# Patient Record
Sex: FEMALE | Race: WHITE | HISPANIC OR LATINO | Employment: STUDENT | URBAN - METROPOLITAN AREA
[De-identification: names, ages, dates, MRNs, and addresses within clinical notes are randomized per-mention and may not be internally consistent; named-entity substitution may affect disease eponyms.]

---
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
## 2022-01-31 ENCOUNTER — TELEPHONE (OUTPATIENT)
Dept: PEDIATRICS CLINIC | Facility: CLINIC | Age: 22
End: 2022-01-31

## 2022-01-31 NOTE — TELEPHONE ENCOUNTER
Spoke to mom scheduled an appointment for daughter to be seen on 2/11/2022 with Meme Mena  Mom states daughter has a heart condition which in [facility] they did 2 ecg  Informed mom if she's able to get the records from [facility] and mom stated they did not give her anything and is not able to get the records

## 2022-02-11 ENCOUNTER — OFFICE VISIT (OUTPATIENT)
Dept: PEDIATRICS CLINIC | Facility: CLINIC | Age: 22
End: 2022-02-11
Payer: COMMERCIAL

## 2022-02-11 VITALS
TEMPERATURE: 98.3 F | DIASTOLIC BLOOD PRESSURE: 64 MMHG | SYSTOLIC BLOOD PRESSURE: 108 MMHG | HEART RATE: 88 BPM | HEIGHT: 62 IN | WEIGHT: 123.8 LBS | BODY MASS INDEX: 22.78 KG/M2

## 2022-02-11 DIAGNOSIS — F41.9 ANXIETY: ICD-10-CM

## 2022-02-11 DIAGNOSIS — R07.9 CHEST PAIN, UNSPECIFIED TYPE: Primary | ICD-10-CM

## 2022-02-11 LAB — SL AMB POCT HGB: 12.2

## 2022-02-11 PROCEDURE — 3008F BODY MASS INDEX DOCD: CPT | Performed by: NURSE PRACTITIONER

## 2022-02-11 PROCEDURE — 85018 HEMOGLOBIN: CPT | Performed by: NURSE PRACTITIONER

## 2022-02-11 PROCEDURE — 1036F TOBACCO NON-USER: CPT | Performed by: NURSE PRACTITIONER

## 2022-02-11 PROCEDURE — 99213 OFFICE O/P EST LOW 20 MIN: CPT | Performed by: NURSE PRACTITIONER

## 2022-02-11 RX ORDER — MULTIVIT WITH MINERALS/LUTEIN
1000 TABLET ORAL DAILY
COMMUNITY

## 2022-02-11 RX ORDER — NAPROXEN SODIUM 550 MG/1
550 TABLET ORAL
COMMUNITY
Start: 2021-04-28 | End: 2022-04-28

## 2022-02-11 RX ORDER — MELATONIN
1000 DAILY
COMMUNITY

## 2022-02-11 NOTE — PROGRESS NOTES
Assessment/Plan:    1  Chest pain, unspecified type  -     Ambulatory Referral to Pediatric Cardiology; Future  -     POCT hemoglobin fingerstick       Results for orders placed or performed in visit on 02/11/22   POCT hemoglobin fingerstick   Result Value Ref Range    Hemoglobin 12 2        Hgb low normal for age  Not technically anemic  Encourage iron rich foods but doesn't need to necessarily supplement  Cardio referral at this point for further eval   Reviewed red flags to go to ER for  Also offered behavioral health referral/list of outpatient mental health providers due to anxiety which may be contributing to symptoms; child and Mom prefer to re-connect with her old therapist   Will call if needing any other resources  If no improvement, asked family to also call back to office  Another consideration for dizziness would be to see ENT again (saw in the past for vestibular therapy, although this should not be contributing to chest pain); or pulmonology consult (for PFT for the chest pain)  Subjective:      Patient ID: Chico Pierre is a 25 y o  female  HPI    Here today for concerns for chest pain, occasional dizziness  Mom also present  Mother and child report that she was previously followed in [facility] for tachycardia and a heart murmur  Mom reports that she thinks the heart murmur was not translated over correctly  She is not sure exactly what her diagnosis was however  She needs to follow-up with a cardiologist here and requests referral today  Child reports that she has been having pain - she indicates in the left upper chest area, periodically  Does not know specifically how frequently it occurs  She describes it as happening at various times of the day  Does not seem like it happens more with activity or with position change  Occasionally feels like she has some shortness of breath but unclear if related to deconditioning  She does report that she is somewhat active  She walks several times a week, and weight lifts once a week  She does also report that she does have a history of anxiety  She sees a therapist, Shayla Trejo, although she has not spoken with her very recently  All of her visits have been virtual   She is not sure if the anxiety is causing the chest pain or if it is related at all  She is currently living in a dorm - studying speech pathology  No vomiting  She does report that she sometimes has irregular menses  Sometimes can happen twice in 1 month  She is followed by OBGYN  The following portions of the patient's history were reviewed and updated as appropriate: allergies, current medications, past family history, past medical history, past social history, past surgical history and problem list     Review of Systems   Constitutional: Negative for fatigue, fever and unexpected weight change  HENT: Negative for congestion, ear pain, facial swelling, sinus pressure, sore throat, trouble swallowing and voice change  Eyes: Negative for discharge  Respiratory: Negative for apnea, cough, wheezing and stridor  Cardiovascular: Negative for chest pain  Gastrointestinal: Negative for abdominal pain, constipation, diarrhea and vomiting  Genitourinary: Positive for menstrual problem  Negative for decreased urine volume  Musculoskeletal: Negative for neck pain  Skin: Negative for rash  Neurological: Negative for headaches  Psychiatric/Behavioral: The patient is nervous/anxious  Objective:      /64   Pulse 88   Temp 98 3 °F (36 8 °C) (Tympanic)   Ht 5' 2 21" (1 58 m)   Wt 56 2 kg (123 lb 12 8 oz)   BMI 22 49 kg/m²        Physical Exam  Constitutional:       General: She is not in acute distress  Appearance: She is well-developed  She is not ill-appearing, toxic-appearing or diaphoretic  HENT:      Head: Normocephalic  Eyes:      Extraocular Movements: Extraocular movements intact        Pupils: Pupils are equal, round, and reactive to light  Cardiovascular:      Rate and Rhythm: Normal rate and regular rhythm  Heart sounds: Normal heart sounds  No murmur heard  No systolic murmur is present  No diastolic murmur is present  No friction rub  No gallop  Pulmonary:      Effort: Pulmonary effort is normal  No accessory muscle usage or respiratory distress  Breath sounds: Normal breath sounds  No stridor  No decreased breath sounds, rhonchi or rales  Chest:      Chest wall: No mass, deformity, tenderness or crepitus  Abdominal:      General: Bowel sounds are normal       Palpations: Abdomen is soft  Musculoskeletal:         General: Normal range of motion  Cervical back: Normal range of motion and neck supple  Right lower leg: No edema  Left lower leg: No edema  Lymphadenopathy:      Cervical: No cervical adenopathy  Skin:     Capillary Refill: Capillary refill takes less than 2 seconds  Findings: No rash  Neurological:      Mental Status: She is alert and oriented to person, place, and time     Psychiatric:         Mood and Affect: Mood normal        no abnormal heart sounds when sitting, supine, standing, squatting, nor after 10 jumping jacks    TMs, oropharynx clear  No cervical adenopathy    Procedures  No reports of sadness/ no suicidal ideations

## 2022-02-25 ENCOUNTER — TELEPHONE (OUTPATIENT)
Dept: PEDIATRICS CLINIC | Facility: CLINIC | Age: 22
End: 2022-02-25

## 2022-02-25 NOTE — TELEPHONE ENCOUNTER
Dary Hill called looking for us to write a letter to her college about housing  She stated she has food allergies and needs housing with a kitchen so she can prepare her own meals  The school needs proof of allergies as well

## 2022-02-25 NOTE — TELEPHONE ENCOUNTER
Hi,   Could you please give a copy of the [facility] report (under Media) from GI to family (they were seen there for concerns for gluten sensitivity)? Please ask them to let us know if they need anything else more specific! Otherwise, they might want to follow up with the specialist   Thank you!

## 2022-08-16 ENCOUNTER — OFFICE VISIT (OUTPATIENT)
Dept: FAMILY MEDICINE CLINIC | Facility: CLINIC | Age: 22
End: 2022-08-16
Payer: COMMERCIAL

## 2022-08-16 VITALS
SYSTOLIC BLOOD PRESSURE: 98 MMHG | BODY MASS INDEX: 23.55 KG/M2 | WEIGHT: 128 LBS | DIASTOLIC BLOOD PRESSURE: 60 MMHG | HEIGHT: 62 IN | HEART RATE: 64 BPM

## 2022-08-16 DIAGNOSIS — Z02.0 SCHOOL PHYSICAL EXAM: ICD-10-CM

## 2022-08-16 DIAGNOSIS — Z13.220 SCREENING FOR LIPID DISORDERS: ICD-10-CM

## 2022-08-16 DIAGNOSIS — Z13.1 SCREENING FOR DIABETES MELLITUS: ICD-10-CM

## 2022-08-16 DIAGNOSIS — Z23 NEED FOR TUBERCULOSIS VACCINATION: ICD-10-CM

## 2022-08-16 DIAGNOSIS — K59.04 CHRONIC IDIOPATHIC CONSTIPATION: Primary | ICD-10-CM

## 2022-08-16 DIAGNOSIS — F41.9 ANXIETY: ICD-10-CM

## 2022-08-16 PROCEDURE — 86580 TB INTRADERMAL TEST: CPT | Performed by: NURSE PRACTITIONER

## 2022-08-16 PROCEDURE — 99204 OFFICE O/P NEW MOD 45 MIN: CPT | Performed by: NURSE PRACTITIONER

## 2022-08-16 RX ORDER — FOLIC ACID 1 MG/1
1 TABLET ORAL DAILY
COMMUNITY

## 2022-08-16 RX ORDER — NICOTINE POLACRILEX 2 MG
1 GUM BUCCAL DAILY
COMMUNITY

## 2022-08-16 NOTE — PROGRESS NOTES
Assessment and Plan:    Problem List Items Addressed This Visit        Digestive    Chronic idiopathic constipation - Primary     Well controlled with as needed use of MiraLax  Other    Anxiety     Patient denies any current issues regarding this  Other Visit Diagnoses     Need for tuberculosis vaccination        Relevant Orders    TB Skin Test (Completed)    School physical exam        Relevant Orders    TB Skin Test (Completed)    Screening for diabetes mellitus        Relevant Orders    Comprehensive metabolic panel    UA w Reflex to Microscopic w Reflex to Culture -Lab Collect    Screening for lipid disorders        Relevant Orders    Lipid Panel with Direct LDL reflex                 Diagnoses and all orders for this visit:    Chronic idiopathic constipation    Need for tuberculosis vaccination  -     TB Skin Test    School physical exam  -     TB Skin Test    Screening for diabetes mellitus  -     Comprehensive metabolic panel; Future  -     UA w Reflex to Microscopic w Reflex to Culture -Lab Collect; Future    Screening for lipid disorders  -     Lipid Panel with Direct LDL reflex; Future    Anxiety    Other orders  -     Biotin 1 MG CAPS; Take 1 capsule by mouth daily  -     co-enzyme Q-10 30 MG capsule; Take 30 mg by mouth daily  -     folic acid (FOLVITE) 1 mg tablet; Take 1 mg by mouth daily            Subjective:      Patient ID: Rubia Martinez is a 25 y o  female  CC:    Chief Complaint   [facility] presents with   [facility]      NEW patient is here to get established  Patient needs a physical  and PPD for [facility]  HPI:    Constipation:  Patient is currently managed on MiraLax PRN  She reports that this controls her constipation well  Anxiety:  She currently denies any problems with anxiety  She reports that she was prescribed medication for this in the past but is no longer using the medication as she feels it is not necessary    She denies any frequent panic attacks or palpitations  The following portions of the patient's history were reviewed and updated as appropriate: allergies, current medications, past family history, past medical history, past social history, past surgical history and problem list       Review of Systems   Constitutional: Negative for chills and fever  HENT: Negative for ear pain and sore throat  Eyes: Negative for pain and visual disturbance  Respiratory: Negative for cough, chest tightness, shortness of breath and wheezing  Cardiovascular: Negative for chest pain, palpitations and leg swelling  Gastrointestinal: Negative for abdominal pain, constipation, diarrhea, nausea and vomiting  Endocrine: Negative for cold intolerance and heat intolerance  Genitourinary: Negative for decreased urine volume, dysuria and hematuria  Musculoskeletal: Negative for arthralgias, back pain and myalgias  Skin: Negative for color change and rash  Allergic/Immunologic: Negative for environmental allergies  Neurological: Negative for dizziness, seizures, syncope, weakness, light-headedness, numbness and headaches  Hematological: Negative for adenopathy  Psychiatric/Behavioral: Negative for confusion  The patient is not nervous/anxious  All other systems reviewed and are negative  Data to review:       Objective:    Vitals:    08/16/22 1422   BP: 98/60   Pulse: 64   Weight: 58 1 kg (128 lb)   Height: 5' 2" (1 575 m)        Physical Exam  Vitals and nursing note reviewed  Constitutional:       General: She is not in acute distress  Appearance: Normal appearance  She is well-developed  She is not ill-appearing  HENT:      Head: Normocephalic and atraumatic  Right Ear: Hearing and tympanic membrane normal       Left Ear: Hearing and tympanic membrane normal    Eyes:      Conjunctiva/sclera: Conjunctivae normal    Cardiovascular:      Rate and Rhythm: Normal rate and regular rhythm        Pulses: Normal pulses  Carotid pulses are 2+ on the right side and 2+ on the left side  Radial pulses are 2+ on the right side and 2+ on the left side  Posterior tibial pulses are 2+ on the right side and 2+ on the left side  Heart sounds: Normal heart sounds  No murmur heard  Pulmonary:      Effort: Pulmonary effort is normal  No respiratory distress  Breath sounds: Normal breath sounds  No wheezing or rhonchi  Abdominal:      General: Abdomen is flat  Bowel sounds are normal  There is no distension  Palpations: Abdomen is soft  Tenderness: There is no abdominal tenderness  There is no guarding  Musculoskeletal:         General: Normal range of motion  Cervical back: Normal range of motion and neck supple  Right lower leg: No edema  Left lower leg: No edema  Skin:     General: Skin is warm and dry  Capillary Refill: Capillary refill takes less than 2 seconds  Neurological:      General: No focal deficit present  Mental Status: She is alert and oriented to person, place, and time  Psychiatric:         Mood and Affect: Mood normal          Behavior: Behavior normal          Thought Content:  Thought content normal          Judgment: Judgment normal

## 2022-08-18 ENCOUNTER — CLINICAL SUPPORT (OUTPATIENT)
Dept: FAMILY MEDICINE CLINIC | Facility: CLINIC | Age: 22
End: 2022-08-18

## 2022-08-18 DIAGNOSIS — Z11.1 ENCOUNTER FOR TUBERCULIN SKIN TEST: Primary | ICD-10-CM

## 2022-08-18 LAB
INDURATION: 0 MM
TB SKIN TEST: NEGATIVE

## 2022-08-25 ENCOUNTER — TELEPHONE (OUTPATIENT)
Dept: FAMILY MEDICINE CLINIC | Facility: CLINIC | Age: 22
End: 2022-08-25

## 2022-08-25 NOTE — TELEPHONE ENCOUNTER
Please make patient aware that her physical paperwork is completed and can be picked up at her convenience  The only part of the paperwork I could not complete was the vision exam as this was not done at her last visit

## 2022-10-14 ENCOUNTER — OFFICE VISIT (OUTPATIENT)
Dept: FAMILY MEDICINE CLINIC | Facility: CLINIC | Age: 22
End: 2022-10-14
Payer: COMMERCIAL

## 2022-10-14 VITALS
HEIGHT: 62 IN | DIASTOLIC BLOOD PRESSURE: 52 MMHG | OXYGEN SATURATION: 98 % | WEIGHT: 125 LBS | BODY MASS INDEX: 23 KG/M2 | SYSTOLIC BLOOD PRESSURE: 96 MMHG | HEART RATE: 64 BPM

## 2022-10-14 DIAGNOSIS — F51.04 PSYCHOPHYSIOLOGICAL INSOMNIA: Primary | ICD-10-CM

## 2022-10-14 DIAGNOSIS — F41.9 ANXIETY: ICD-10-CM

## 2022-10-14 PROCEDURE — 99214 OFFICE O/P EST MOD 30 MIN: CPT | Performed by: NURSE PRACTITIONER

## 2022-10-14 RX ORDER — HYDROXYZINE HYDROCHLORIDE 25 MG/1
25 TABLET, FILM COATED ORAL EVERY 6 HOURS PRN
Qty: 30 TABLET | Refills: 1 | Status: SHIPPED | OUTPATIENT
Start: 2022-10-14

## 2022-10-14 NOTE — ASSESSMENT & PLAN NOTE
Patient was advised to take hydroxyzine at least 30-45 minutes before bed to help with insomnia  I will follow-up with patient in 1 month to reassess her symptoms  TSH and T4 levels were also ordered to assess for any thyroid abnormalities which could be contributing to anxiety and insomnia

## 2022-10-14 NOTE — ASSESSMENT & PLAN NOTE
Patient reports daily anxiety and I feel this is most likely contributing to her insomnia as well  Patient was not interested in a daily medication for anxiety so she was started on hydroxyzine 25 mg to be used as needed up to every 6 hours  I spoke with patient about potential side effects of medication including drowsiness  I will have patient return to the office in 1 month to reassess her symptoms

## 2022-10-14 NOTE — LETTER
October 14, 2022     Patient: Liya Kee  YOB: 2000  Date of Visit: 10/14/2022      To Whom it May Concern:    Liya Kee is under my professional care  Joseluis Galvan was seen in my office on 10/14/2022  Joseluis Galvan she be allowed to not have a meal plan as she has a diagnosed history of gluten and lactose intolerance and due to cross contamination of food in the dining ferguson she is unable to eat this food  If you have any questions or concerns, please don't hesitate to call           Sincerely,          TONI Barnes        CC: No Recipients

## 2022-11-25 ENCOUNTER — OFFICE VISIT (OUTPATIENT)
Dept: FAMILY MEDICINE CLINIC | Facility: CLINIC | Age: 22
End: 2022-11-25

## 2022-11-25 VITALS
HEART RATE: 63 BPM | WEIGHT: 125.38 LBS | TEMPERATURE: 97.2 F | OXYGEN SATURATION: 98 % | HEIGHT: 62 IN | SYSTOLIC BLOOD PRESSURE: 102 MMHG | DIASTOLIC BLOOD PRESSURE: 64 MMHG | BODY MASS INDEX: 23.07 KG/M2

## 2022-11-25 DIAGNOSIS — F51.04 PSYCHOPHYSIOLOGICAL INSOMNIA: Primary | ICD-10-CM

## 2022-11-25 DIAGNOSIS — F41.9 ANXIETY: ICD-10-CM

## 2022-11-25 NOTE — PROGRESS NOTES
Name: Esa Ortiz      : 2000      MRN: [id number]  Encounter Provider: Cleopatra Mohs, CRNP  Encounter Date: 2022   Encounter department: [place] [id number]  Psychophysiological insomnia  Assessment & Plan:  Patient's symptoms have improved with hydroxyzine use  Patient will be maintained on current dosage of hydroxyzine as needed  2  Anxiety  Assessment & Plan:  Patient's symptoms have improved with hydroxyzine use  Depression Screening and Follow-up Plan: Patient was screened for depression during today's encounter  They screened negative with a PHQ-2 score of 0  Subjective      Insomnia/anxiety: At patient's last office visit she was reporting daily anxiety which was also causing insomnia for her  Patient was not interested in taking a daily medication for anxiety so she was started on hydroxyzine 25 mg as needed every 6 hours  The patient reports that this medication has improved her insomnia  She reports she is currently only taking the medication about 3 nights per week but this has been allowing her to fall asleep and stay asleep  She reports that she has not been taking the medication during the daytime but she feels as though her anxiety has been more controlled regardless  She denies any recent panic attacks or palpitations  Review of Systems   Constitutional: Negative for chills and fever  HENT: Negative for ear pain and sore throat  Eyes: Negative for pain and visual disturbance  Respiratory: Negative for cough, chest tightness, shortness of breath and wheezing  Cardiovascular: Negative for chest pain, palpitations and leg swelling  Gastrointestinal: Negative for abdominal pain, constipation, diarrhea, nausea and vomiting  Endocrine: Negative for cold intolerance and heat intolerance  Genitourinary: Negative for decreased urine volume, dysuria and hematuria     Musculoskeletal: Negative for arthralgias, back pain and myalgias  Skin: Negative for color change and rash  Allergic/Immunologic: Negative for environmental allergies  Neurological: Negative for dizziness, seizures, syncope, weakness, light-headedness, numbness and headaches  Hematological: Negative for adenopathy  Psychiatric/Behavioral: Positive for sleep disturbance (improved)  Negative for confusion, self-injury and suicidal ideas  The patient is nervous/anxious (improved)  All other systems reviewed and are negative        Current Outpatient Medications on File Prior to Visit   Medication Sig   • Ascorbic Acid (vitamin C) 1000 MG tablet Take 1,000 mg by mouth daily   • Biotin 1 MG CAPS Take 1 capsule by mouth daily   • bisacodyl (DULCOLAX) 5 mg EC tablet Take 5 mg by mouth   • cetirizine (ZyrTEC) 10 mg tablet Take 1 tablet (10 mg total) by mouth daily (Patient taking differently: Take 10 mg by mouth daily PRN)   • cholecalciferol (VITAMIN D3) 1,000 units tablet Take 1,000 Units by mouth daily   • co-enzyme Q-10 30 MG capsule Take 30 mg by mouth daily   • folic acid (FOLVITE) 1 mg tablet Take 1 mg by mouth daily   • hydrOXYzine HCL (ATARAX) 25 mg tablet Take 1 tablet (25 mg total) by mouth every 6 (six) hours as needed for anxiety (As needed for anxiety and insomnia )   • Multiple Vitamins-Minerals (MULTIVITAMIN ADULT) TABS Take by mouth   • naproxen sodium (ANAPROX) 550 mg tablet Take 550 mg by mouth   • ondansetron (ZOFRAN-ODT) 8 mg disintegrating tablet Take 8 mg by mouth every 8 (eight) hours as needed (Patient not taking: Reported on 10/14/2022)   • Polyethylene Glycol 1000 POWD Take 17 g by mouth daily PRN (Patient not taking: Reported on 10/14/2022)       Objective     /64 (BP Location: Right arm, Patient Position: Sitting, Cuff Size: Standard)   Pulse 63   Temp (!) 97 2 °F (36 2 °C) (Temporal)   Ht 5' 2" (1 575 m)   Wt 56 9 kg (125 lb 6 oz)   SpO2 98%   BMI 22 93 kg/m²     Physical Exam  Vitals and nursing note reviewed  Constitutional:       General: She is not in acute distress  Appearance: Normal appearance  She is not ill-appearing  HENT:      Head: Normocephalic  Eyes:      Conjunctiva/sclera: Conjunctivae normal    Cardiovascular:      Rate and Rhythm: Normal rate and regular rhythm  Pulses: Normal pulses  no weak pulses          Carotid pulses are 2+ on the right side and 2+ on the left side  Radial pulses are 2+ on the right side and 2+ on the left side  Posterior tibial pulses are 2+ on the right side and 2+ on the left side  Heart sounds: Normal heart sounds  No murmur heard  Pulmonary:      Effort: Pulmonary effort is normal  No respiratory distress  Breath sounds: Normal breath sounds  No wheezing or rhonchi  Abdominal:      General: Abdomen is flat  Bowel sounds are normal  There is no distension  Palpations: Abdomen is soft  Tenderness: There is no abdominal tenderness  There is no guarding  Musculoskeletal:         General: Normal range of motion  Cervical back: Normal range of motion  Right lower leg: No edema  Left lower leg: No edema  Skin:     General: Skin is warm and dry  Capillary Refill: Capillary refill takes less than 2 seconds  Neurological:      General: No focal deficit present  Mental Status: She is alert and oriented to person, place, and time  Psychiatric:         Mood and Affect: Mood normal          Behavior: Behavior normal          Thought Content:  Thought content normal          Judgment: Judgment normal        TONI Jefferson

## 2022-11-25 NOTE — ASSESSMENT & PLAN NOTE
Patient's symptoms have improved with hydroxyzine use  Patient will be maintained on current dosage of hydroxyzine as needed

## 2022-12-27 ENCOUNTER — CLINICAL SUPPORT (OUTPATIENT)
Dept: FAMILY MEDICINE CLINIC | Facility: CLINIC | Age: 22
End: 2022-12-27

## 2022-12-27 DIAGNOSIS — Z11.1 SCREENING FOR TUBERCULOSIS: Primary | ICD-10-CM

## 2022-12-27 NOTE — PROGRESS NOTES
PPD placed on left lower arm  Pt aware she will need to come back Thursday for reading around 305pm (time of administration) Pt does not have paper work that needs to be completed but needs proof of neg results when read

## 2022-12-29 ENCOUNTER — CLINICAL SUPPORT (OUTPATIENT)
Dept: FAMILY MEDICINE CLINIC | Facility: CLINIC | Age: 22
End: 2022-12-29

## 2022-12-29 DIAGNOSIS — Z11.1 SCREENING FOR TUBERCULOSIS: Primary | ICD-10-CM

## 2022-12-29 LAB
INDURATION: 0 MM
TB SKIN TEST: NEGATIVE

## 2023-01-12 ENCOUNTER — TELEPHONE (OUTPATIENT)
Dept: OTHER | Facility: OTHER | Age: 23
End: 2023-01-12

## 2023-01-13 ENCOUNTER — APPOINTMENT (OUTPATIENT)
Dept: LAB | Facility: CLINIC | Age: 23
End: 2023-01-13

## 2023-01-13 ENCOUNTER — OFFICE VISIT (OUTPATIENT)
Dept: FAMILY MEDICINE CLINIC | Facility: CLINIC | Age: 23
End: 2023-01-13

## 2023-01-13 VITALS
DIASTOLIC BLOOD PRESSURE: 64 MMHG | HEIGHT: 62 IN | WEIGHT: 121 LBS | BODY MASS INDEX: 22.26 KG/M2 | OXYGEN SATURATION: 97 % | SYSTOLIC BLOOD PRESSURE: 100 MMHG | HEART RATE: 112 BPM | RESPIRATION RATE: 16 BRPM | TEMPERATURE: 98.5 F

## 2023-01-13 DIAGNOSIS — R19.7 DIARRHEA, UNSPECIFIED TYPE: Primary | ICD-10-CM

## 2023-01-13 DIAGNOSIS — R10.84 GENERALIZED ABDOMINAL PAIN: ICD-10-CM

## 2023-01-13 DIAGNOSIS — R19.7 DIARRHEA, UNSPECIFIED TYPE: ICD-10-CM

## 2023-01-13 NOTE — PROGRESS NOTES
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, cold  Sx  Mostly  Resolved but  Has  Diarrhea, abd  Pain and nausea, is due  For menstrual    Review of Systems   Constitutional: Positive for appetite change  Negative for activity change and fatigue  Decreased appetite   HENT: Positive for congestion  Respiratory: Negative for shortness of breath  Cardiovascular: Negative for chest pain  Gastrointestinal: Positive for abdominal pain, diarrhea and nausea  Neurological: Negative for dizziness and headaches         Current Outpatient Medications on File Prior to Visit   Medication Sig   • Ascorbic Acid (vitamin C) 1000 MG tablet Take 1,000 mg by mouth daily   • Biotin 1 MG CAPS Take 1 capsule by mouth daily   • bisacodyl (DULCOLAX) 5 mg EC tablet Take 5 mg by mouth   • cetirizine (ZyrTEC) 10 mg tablet Take 1 tablet (10 mg total) by mouth daily (Patient taking differently: Take 10 mg by mouth daily PRN)   • cholecalciferol (VITAMIN D3) 1,000 units tablet Take 1,000 Units by mouth daily   • co-enzyme Q-10 30 MG capsule Take 30 mg by mouth daily   • folic acid (FOLVITE) 1 mg tablet Take 1 mg by mouth daily   • hydrOXYzine HCL (ATARAX) 25 mg tablet Take 1 tablet (25 mg total) by mouth every 6 (six) hours as needed for anxiety (As needed for anxiety and insomnia )   • Multiple Vitamins-Minerals (MULTIVITAMIN ADULT) TABS Take by mouth   • naproxen sodium (ANAPROX) 550 mg tablet Take 550 mg by mouth • [DISCONTINUED] ondansetron (ZOFRAN-ODT) 8 mg disintegrating tablet Take 8 mg by mouth every 8 (eight) hours as needed (Patient not taking: Reported on 10/14/2022)   • [DISCONTINUED] Polyethylene Glycol 1000 POWD Take 17 g by mouth daily PRN (Patient not taking: Reported on 10/14/2022)       Objective     /64 (BP Location: Left arm, Patient Position: Sitting, Cuff Size: Large)   Pulse (!) 112   Temp 98 5 °F (36 9 °C) (Tympanic)   Resp 16   Ht 5' 2" (1 575 m)   Wt 54 9 kg (121 lb)   SpO2 97%   BMI 22 13 kg/m²     Physical Exam  Vitals reviewed  Constitutional:       Appearance: Normal appearance  HENT:      Right Ear: Tympanic membrane normal       Left Ear: Tympanic membrane normal       Nose: No congestion or rhinorrhea  Mouth/Throat:      Pharynx: No oropharyngeal exudate or posterior oropharyngeal erythema  Cardiovascular:      Rate and Rhythm: Normal rate and regular rhythm  Pulses: Normal pulses  Heart sounds: Normal heart sounds  Pulmonary:      Effort: Pulmonary effort is normal       Breath sounds: Normal breath sounds  Abdominal:      General: Abdomen is flat  Bowel sounds are normal       Palpations: Abdomen is soft  Tenderness: There is abdominal tenderness  Comments: Mild diffuse  tenderness   Neurological:      Mental Status: She is alert     Psychiatric:         Mood and Affect: Mood normal        Jewell Walters MD

## 2023-01-14 LAB — C DIFF TOX GENS STL QL NAA+PROBE: NEGATIVE

## 2023-02-21 DIAGNOSIS — F51.04 PSYCHOPHYSIOLOGICAL INSOMNIA: ICD-10-CM

## 2023-02-21 DIAGNOSIS — F41.9 ANXIETY: ICD-10-CM

## 2023-02-22 RX ORDER — HYDROXYZINE HYDROCHLORIDE 25 MG/1
25 TABLET, FILM COATED ORAL EVERY 6 HOURS PRN
Qty: 30 TABLET | Refills: 1 | Status: SHIPPED | OUTPATIENT
Start: 2023-02-22

## 2023-03-06 ENCOUNTER — TELEPHONE (OUTPATIENT)
Dept: FAMILY MEDICINE CLINIC | Facility: CLINIC | Age: 23
End: 2023-03-06

## 2023-03-07 ENCOUNTER — TELEPHONE (OUTPATIENT)
Dept: FAMILY MEDICINE CLINIC | Facility: CLINIC | Age: 23
End: 2023-03-07

## 2023-03-07 NOTE — TELEPHONE ENCOUNTER
Would patient be able to bring in her COVID-vaccine cards to the office so that this can be updated in the system?

## 2023-03-07 NOTE — TELEPHONE ENCOUNTER
Patient called back she had her Covid Vaccines  9/4/2021 and 9/25/2021 at [facility] in [place]

## 2023-03-07 NOTE — TELEPHONE ENCOUNTER
Can we please contact the patient and ask her whether or not she has ever received a COVID vaccine? It appears this is required as part of her preemployment paperwork

## 2023-04-28 ENCOUNTER — TELEPHONE (OUTPATIENT)
Dept: FAMILY MEDICINE CLINIC | Facility: CLINIC | Age: 23
End: 2023-04-28

## 2023-04-28 NOTE — TELEPHONE ENCOUNTER
Patient called in and asking if she can receive the lab script, to receive a titer for the tb test  Please advise   Thank you

## 2023-04-28 NOTE — TELEPHONE ENCOUNTER
Patient did have PPD testing in August and December 2022 which was negative  It sounds like according to previous telephone notes in the chart that she just needs a copy of these negative results faxed

## 2023-04-28 NOTE — TELEPHONE ENCOUNTER
Patient will be calling back with a fax number she needs the results of her TB test faxed paper with the results are on Heidi's desk

## 2023-05-01 DIAGNOSIS — Z00.00 WELL ADULT EXAM: ICD-10-CM

## 2023-05-01 DIAGNOSIS — Z23 NEED FOR TUBERCULOSIS VACCINATION: Primary | ICD-10-CM

## 2023-06-02 ENCOUNTER — OFFICE VISIT (OUTPATIENT)
Dept: FAMILY MEDICINE CLINIC | Facility: CLINIC | Age: 23
End: 2023-06-02

## 2023-06-02 VITALS
BODY MASS INDEX: 21.94 KG/M2 | SYSTOLIC BLOOD PRESSURE: 82 MMHG | HEART RATE: 85 BPM | HEIGHT: 62 IN | WEIGHT: 119.2 LBS | DIASTOLIC BLOOD PRESSURE: 60 MMHG | OXYGEN SATURATION: 99 %

## 2023-06-02 DIAGNOSIS — F41.9 ANXIETY: ICD-10-CM

## 2023-06-02 DIAGNOSIS — F51.04 PSYCHOPHYSIOLOGICAL INSOMNIA: Primary | ICD-10-CM

## 2023-06-02 DIAGNOSIS — E86.1 HYPOTENSION DUE TO HYPOVOLEMIA: ICD-10-CM

## 2023-06-02 DIAGNOSIS — I95.89 HYPOTENSION DUE TO HYPOVOLEMIA: ICD-10-CM

## 2023-06-02 RX ORDER — TRAZODONE HYDROCHLORIDE 50 MG/1
50 TABLET ORAL
Qty: 30 TABLET | Refills: 2 | Status: SHIPPED | OUTPATIENT
Start: 2023-06-02

## 2023-06-02 NOTE — ASSESSMENT & PLAN NOTE
Hydroxyzine was discontinued and patient will be trialed on trazodone 50 mg at bedtime to help with insomnia symptoms  I will have patient return to the office in 3 months to reassess her symptoms

## 2023-06-02 NOTE — ASSESSMENT & PLAN NOTE
Patient is not currently interested in taking any medications for her anxiety as she reports this is manageable  I will continue to monitor this

## 2023-06-02 NOTE — PROGRESS NOTES
Name: Ron Núñez      : 2000      MRN: [id number]  Encounter Provider: TONI Hoang  Encounter Date: 2023   Encounter department: [place] [address]     1  Psychophysiological insomnia  Assessment & Plan:  Hydroxyzine was discontinued and patient will be trialed on trazodone 50 mg at bedtime to help with insomnia symptoms  I will have patient return to the office in 3 months to reassess her symptoms  Orders:  -     traZODone (DESYREL) 50 mg tablet; Take 1 tablet (50 mg total) by mouth daily at bedtime    2  Anxiety  Assessment & Plan:  Patient is not currently interested in taking any medications for her anxiety as she reports this is manageable  I will continue to monitor this  3  Hypotension due to hypovolemia        Depression Screening and Follow-up Plan: Patient was screened for depression during today's encounter  They screened negative with a PHQ-2 score of 0  Subjective      Anxiety/insomnia: Patient was previously prescribed hydroxyzine 25 mg to be used as needed for insomnia and anxiety  Patient reports that she feels that the medication has not been improving her anxiety much however, she feels that her anxiety is manageable currently without medication  She denies any frequent panic attacks or palpitations  Patient did previously increase the dosage of hydroxyzine to 50 mg at bedtime to help with insomnia and she reports that this has been improving her symptoms however, she has been waking with headaches after taking the medication so she is interested in discontinuing the medication and trialing something different  Low BP: Patient's blood pressure was noted to be slightly low in the office today  Patient currently denies any lightheadedness, dizziness, or orthostasis  Patient was advised to increase her water intake as she is most likely dehydrated  Review of Systems   Constitutional: Negative for chills and fever  HENT: Negative for ear pain and sore throat  Eyes: Negative for pain and visual disturbance  Respiratory: Negative for cough, chest tightness, shortness of breath and wheezing  Cardiovascular: Negative for chest pain, palpitations and leg swelling  Gastrointestinal: Negative for abdominal pain, constipation, diarrhea, nausea and vomiting  Endocrine: Negative for cold intolerance and heat intolerance  Genitourinary: Negative for decreased urine volume, dysuria and hematuria  Musculoskeletal: Negative for arthralgias, back pain and myalgias  Skin: Negative for color change and rash  Allergic/Immunologic: Negative for environmental allergies  Neurological: Negative for dizziness, seizures, syncope, weakness, light-headedness, numbness and headaches  Hematological: Negative for adenopathy  Psychiatric/Behavioral: Positive for sleep disturbance (insomnia)  Negative for confusion, self-injury and suicidal ideas  The patient is nervous/anxious (intermittent)  All other systems reviewed and are negative        Current Outpatient Medications on File Prior to Visit   Medication Sig   • Ascorbic Acid (vitamin C) 1000 MG tablet Take 1,000 mg by mouth daily   • Biotin 1 MG CAPS Take 1 capsule by mouth daily   • cholecalciferol (VITAMIN D3) 1,000 units tablet Take 1,000 Units by mouth daily   • co-enzyme Q-10 30 MG capsule Take 30 mg by mouth daily   • folic acid (FOLVITE) 1 mg tablet Take 1 mg by mouth daily   • [DISCONTINUED] hydrOXYzine HCL (ATARAX) 25 mg tablet Take 1 tablet (25 mg total) by mouth every 6 (six) hours as needed for anxiety (As needed for anxiety and insomnia )   • bisacodyl (DULCOLAX) 5 mg EC tablet Take 5 mg by mouth   • cetirizine (ZyrTEC) 10 mg tablet Take 1 tablet (10 mg total) by mouth daily (Patient taking differently: Take 10 mg by mouth daily PRN)   • Multiple Vitamins-Minerals (MULTIVITAMIN ADULT) TABS Take by mouth (Patient not taking: Reported on 6/2/2023)   • naproxen "sodium (ANAPROX) 550 mg tablet Take 550 mg by mouth       Objective     BP (!) 82/60 (BP Location: Right arm, Patient Position: Sitting, Cuff Size: Standard)   Pulse 85   Ht 5' 2\" (1 575 m)   Wt 54 1 kg (119 lb 3 2 oz)   SpO2 99%   BMI 21 80 kg/m²     Physical Exam  Vitals and nursing note reviewed  Constitutional:       General: She is not in acute distress  Appearance: Normal appearance  She is not ill-appearing  HENT:      Head: Normocephalic  Eyes:      Conjunctiva/sclera: Conjunctivae normal    Cardiovascular:      Rate and Rhythm: Normal rate and regular rhythm  Pulses: Normal pulses  Carotid pulses are 2+ on the right side and 2+ on the left side  Radial pulses are 2+ on the right side and 2+ on the left side  Posterior tibial pulses are 2+ on the right side and 2+ on the left side  Heart sounds: Normal heart sounds  No murmur heard  Pulmonary:      Effort: Pulmonary effort is normal  No respiratory distress  Breath sounds: Normal breath sounds  No decreased breath sounds, wheezing, rhonchi or rales  Abdominal:      General: Abdomen is flat  Bowel sounds are normal  There is no distension  Palpations: Abdomen is soft  Tenderness: There is no abdominal tenderness  There is no guarding  Musculoskeletal:         General: Normal range of motion  Cervical back: Normal range of motion  Right lower leg: No edema  Left lower leg: No edema  Skin:     General: Skin is warm and dry  Capillary Refill: Capillary refill takes less than 2 seconds  Neurological:      General: No focal deficit present  Mental Status: She is alert and oriented to person, place, and time  Psychiatric:         Mood and Affect: Mood normal          Behavior: Behavior normal          Thought Content:  Thought content normal          Judgment: Judgment normal        TONI Cox  "

## 2023-06-24 DIAGNOSIS — F51.04 PSYCHOPHYSIOLOGICAL INSOMNIA: ICD-10-CM

## 2023-06-26 RX ORDER — TRAZODONE HYDROCHLORIDE 50 MG/1
TABLET ORAL
Qty: 90 TABLET | Refills: 1 | Status: SHIPPED | OUTPATIENT
Start: 2023-06-26

## 2023-07-18 ENCOUNTER — CLINICAL SUPPORT (OUTPATIENT)
Dept: FAMILY MEDICINE CLINIC | Facility: CLINIC | Age: 23
End: 2023-07-18
Payer: COMMERCIAL

## 2023-07-18 DIAGNOSIS — Z11.1 SCREENING FOR TUBERCULOSIS: Primary | ICD-10-CM

## 2023-07-18 PROCEDURE — 86580 TB INTRADERMAL TEST: CPT

## 2023-07-19 ENCOUNTER — OFFICE VISIT (OUTPATIENT)
Dept: FAMILY MEDICINE CLINIC | Facility: CLINIC | Age: 23
End: 2023-07-19
Payer: COMMERCIAL

## 2023-07-19 VITALS
BODY MASS INDEX: 22.38 KG/M2 | DIASTOLIC BLOOD PRESSURE: 58 MMHG | WEIGHT: 121.6 LBS | TEMPERATURE: 97.9 F | SYSTOLIC BLOOD PRESSURE: 100 MMHG | HEART RATE: 88 BPM | HEIGHT: 62 IN

## 2023-07-19 DIAGNOSIS — Z00.00 ENCOUNTER FOR PHYSICAL EXAMINATION: Primary | ICD-10-CM

## 2023-07-19 PROCEDURE — 99395 PREV VISIT EST AGE 18-39: CPT | Performed by: PHYSICIAN ASSISTANT

## 2023-07-19 RX ORDER — HYDROXYZINE HYDROCHLORIDE 25 MG/1
TABLET, FILM COATED ORAL
COMMUNITY
Start: 2023-04-10 | End: 2023-07-19

## 2023-07-19 NOTE — ASSESSMENT & PLAN NOTE
Forms completed for [employer] of speech [employer]. Patient needs her PPD read tomorrow so we will keep the forms for her to  after this information is inputted. Her tetanus is good until next year and she is up-to-date with eye and dental and exercises daily.

## 2023-07-19 NOTE — PATIENT INSTRUCTIONS
1. Encounter for physical examination  Assessment & Plan:  Forms completed for [employer] of speech [employer]. Patient needs her PPD read tomorrow so we will keep the forms for her to  after this information is inputted. Her tetanus is good until next year and she is up-to-date with eye and dental and exercises daily.

## 2023-07-19 NOTE — PROGRESS NOTES
[address] [facility] PRIMARY CARE    NAME: Sedrick Draper  AGE: 23 y.o. 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 Patient needs her PPD read tomorrow so we will keep the forms for her to  after this information is inputted. Her tetanus is good until next year and she is up-to-date with eye and dental and exercises daily. Immunizations and preventive care screenings were discussed with patient today. Appropriate education was printed on patient's after visit summary. Counseling:  none      Depression Screening and Follow-up Plan: Patient was screened for depression during today's encounter. They screened negative with a PHQ-2 score of 0. No follow-ups on file. Chief Complaint:     Chief Complaint   Patient presents with   • Annual Exam     Pt needs form filled out for school, TB was placed yesterday will come back tomorrow for read. History of Present Illness:     Adult Annual Physical   Patient here for a comprehensive physical exam. The patient reports no problems. Diet and Physical Activity  Diet/Nutrition: well balanced diet. Exercise: 5-7 times a week on average. Depression Screening  PHQ-2/9 Depression Screening    Little interest or pleasure in doing things: 0 - not at all  Feeling down, depressed, or hopeless: 0 - not at all  PHQ-2 Score: 0  PHQ-2 Interpretation: Negative depression screen       General Health  Sleep: sleeps poorly. Hearing: normal - bilateral.  Vision: goes for regular eye exams. Dental: regular dental visits. /GYN Health  Patient is: premenopausal  Last menstrual period: monthly  Contraceptive method: no sex.      Review of Systems:     Review of Systems   Constitutional: Negative. HENT: Negative. Eyes: Negative. Respiratory: Negative. Cardiovascular: Negative. Gastrointestinal: Negative. Endocrine: Negative. Genitourinary: Negative. Musculoskeletal: Negative. Skin: Negative. Allergic/Immunologic: Negative. Neurological: Negative. Hematological: Negative. Psychiatric/Behavioral: Negative.        Past Medical History:     Past Medical History:   Diagnosis Date   • COVID-19 01/2022   • Fatigue     last assessed: 3/20/17   • Fracture of humerus    • GERD (gastroesophageal reflux disease)     last assessed: 6/21/17   • Ovarian cyst 2021   • Urinary tract infection       Past Surgical History:     Past Surgical History:   Procedure Laterality Date   • EGD  05/2020   • GALLBLADDER SURGERY  02/2019   • WISDOM TOOTH EXTRACTION        Social History:     Social History     Socioeconomic History   • Marital status: Single     Spouse name: None   • Number of children: None   • Years of education: None   • Highest education level: None   Occupational History   • None   Tobacco Use   • Smoking status: Never   • Smokeless tobacco: Never   • Tobacco comments:     denied: history of exposure to tobacco smoke   Substance and Sexual Activity   • Alcohol use: None   • Drug use: None   • Sexual activity: None   Other Topics Concern   • None   Social History Narrative    Lives with parents ()    Pets/animals: dog    Seeing a dentist    3 BROTHER    2 SISTERS     Social Determinants of Health     Financial Resource Strain: Not on file   Food Insecurity: Not on file   Transportation Needs: Not on file   Physical Activity: Not on file   Stress: Not on file   Social Connections: Not on file   Intimate Partner Violence: Not on file   Housing Stability: Not on file      Family History:     Family History   Problem Relation Age of Onset   • No Known Problems Mother    • No Known Problems Father    • Alcohol abuse Maternal Aunt       Current Medications:     Current Outpatient Medications   Medication Sig Dispense Refill   • Biotin 1 MG CAPS Take 1 capsule by mouth daily     • cholecalciferol (VITAMIN D3) 1,000 units tablet Take 1,000 Units by mouth daily     • co-enzyme Q-10 30 MG capsule Take 30 mg by mouth daily     • traZODone (DESYREL) 50 mg tablet TAKE 1 TABLET BY MOUTH DAILY AT BEDTIME 90 tablet 1   • bisacodyl (DULCOLAX) 5 mg EC tablet Take 5 mg by mouth     • folic acid (FOLVITE) 1 mg tablet Take 1 mg by mouth daily     • naproxen sodium (ANAPROX) 550 mg tablet Take 550 mg by mouth       No current facility-administered medications for this visit. Allergies: Allergies   Allergen Reactions   • Chocolate - Food Allergy Abdominal Pain     + allergy testing   • Gluten Meal - Food Allergy Abdominal Pain   • Lactose - Food Allergy Abdominal Pain   • Other Abdominal Pain and Other (See Comments)     + allergy testing   + allergy testing    • Penicillins Rash      Physical Exam:     /58 (BP Location: Right arm, Patient Position: Sitting, Cuff Size: Standard)   Pulse 88   Temp 97.9 °F (36.6 °C) (Temporal)   Ht 5' 2" (1.575 m) Comment: on file  Wt 55.2 kg (121 lb 9.6 oz)   BMI 22.24 kg/m²     Physical Exam  Vitals and nursing note reviewed. Constitutional:       General: She is not in acute distress. Appearance: She is well-developed. She is not diaphoretic. HENT:      Head: Normocephalic and atraumatic. Right Ear: External ear normal.      Left Ear: External ear normal.      Nose: Nose normal.      Mouth/Throat:      Pharynx: No oropharyngeal exudate. Eyes:      General: No scleral icterus. Right eye: No discharge. Left eye: No discharge. Conjunctiva/sclera: Conjunctivae normal.      Pupils: Pupils are equal, round, and reactive to light. Neck:      Thyroid: No thyromegaly. Vascular: No JVD. Trachea: No tracheal deviation. Cardiovascular:      Rate and Rhythm: Normal rate and regular rhythm.       Heart sounds: Normal heart sounds. No murmur heard. No friction rub. No gallop. Pulmonary:      Effort: Pulmonary effort is normal. No respiratory distress. Breath sounds: Normal breath sounds. No stridor. No wheezing or rales. Chest:      Chest wall: No tenderness. Abdominal:      General: Bowel sounds are normal. There is no distension. Palpations: Abdomen is soft. There is no mass. Tenderness: There is no abdominal tenderness. There is no guarding or rebound. Hernia: No hernia is present. Musculoskeletal:         General: No deformity. Normal range of motion. Cervical back: Normal range of motion and neck supple. Lymphadenopathy:      Cervical: No cervical adenopathy. Skin:     General: Skin is warm and dry. Capillary Refill: Capillary refill takes more than 3 seconds. Findings: No rash. Neurological:      Mental Status: She is alert and oriented to person, place, and time. Motor: No abnormal muscle tone. Coordination: Coordination normal.      Deep Tendon Reflexes: Reflexes normal.   Psychiatric:         Behavior: Behavior normal.         Thought Content:  Thought content normal.         Judgment: Judgment normal.        Vision Screening    Right eye Left eye Both eyes   Without correction      With correction 20/25 20/40 20/20         Michael Meier PA-C  [address] [facility]

## 2023-07-20 ENCOUNTER — CLINICAL SUPPORT (OUTPATIENT)
Dept: FAMILY MEDICINE CLINIC | Facility: CLINIC | Age: 23
End: 2023-07-20

## 2023-07-20 DIAGNOSIS — Z11.1 ENCOUNTER FOR PPD SKIN TEST READING: Primary | ICD-10-CM

## 2023-07-20 LAB
INDURATION: 0 MM
TB SKIN TEST: NEGATIVE

## 2023-08-23 ENCOUNTER — TELEMEDICINE (OUTPATIENT)
Dept: FAMILY MEDICINE CLINIC | Facility: CLINIC | Age: 23
End: 2023-08-23
Payer: COMMERCIAL

## 2023-08-23 DIAGNOSIS — F51.04 PSYCHOPHYSIOLOGICAL INSOMNIA: Primary | ICD-10-CM

## 2023-08-23 DIAGNOSIS — F41.9 ANXIETY: ICD-10-CM

## 2023-08-23 PROCEDURE — 99214 OFFICE O/P EST MOD 30 MIN: CPT | Performed by: NURSE PRACTITIONER

## 2023-08-23 RX ORDER — LORAZEPAM 0.5 MG/1
0.5 TABLET ORAL
Qty: 30 TABLET | Refills: 0 | Status: SHIPPED | OUTPATIENT
Start: 2023-08-23 | End: 2023-08-24 | Stop reason: SDUPTHER

## 2023-08-23 NOTE — ASSESSMENT & PLAN NOTE
Ativan 0.5 mg was ordered to be taken at bedtime to help improve insomnia symptoms. PDMP was reviewed and no red flags were noted. I did speak with patient about the addiction potential of the medication as well as the importance of safely storing the medication and not abusing the medication. I will have patient follow-up in 1 month to reassess her symptoms.

## 2023-08-23 NOTE — PROGRESS NOTES
Virtual Regular Visit    Verification of patient location:    Patient is located at Home in the following state in which I hold an active license [place]      Assessment/Plan:    Problem List Items Addressed This Visit        Other    Anxiety     Ativan 0.5 mg was ordered to be taken at bedtime as needed to help with anxiety symptoms. Relevant Medications    LORazepam (Ativan) 0.5 mg tablet    Psychophysiological insomnia - Primary     Ativan 0.5 mg was ordered to be taken at bedtime to help improve insomnia symptoms. PDMP was reviewed and no red flags were noted. I did speak with patient about the addiction potential of the medication as well as the importance of safely storing the medication and not abusing the medication. I will have patient follow-up in 1 month to reassess her symptoms. Relevant Medications    LORazepam (Ativan) 0.5 mg tablet            Reason for visit is   Chief Complaint   Patient presents with   • Virtual Regular Visit   • Follow-up   • Medication Management   • Virtual Regular Visit        Encounter provider TONI Christine    Provider located at [address] [address] [phone]      Recent Visits  No visits were found meeting these conditions. Showing recent visits within past 7 days and meeting all other requirements  Today's Visits  Date Type Provider Dept   08/23/23 Telemedicine Ambrosetarah Padron, [address] [facility]   Showing today's visits and meeting all other requirements  Future Appointments  No visits were found meeting these conditions. Showing future appointments within next 150 days and meeting all other requirements       The patient was identified by name and date of birth. Tammy Carter was informed that this is a telemedicine visit and that the visit is being conducted through the [employer]. She agrees to proceed. .  My office door was closed.  No one else was in the room. She acknowledged consent and understanding of privacy and security of the video platform. The patient has agreed to participate and understands they can discontinue the visit at any time. Patient is aware this is a billable service. Subjective  Kailee Booth is a 23 y.o. female  . Insomnia: Patient was previously started on trazodone 50 mg at bedtime to help with insomnia symptoms. Patient contacted the office a few weeks after starting the medication and stated that it had not improved her symptoms much so she was advised to increase the dosage of trazodone to 100 mg at bedtime. The patient reports that she was unable to tolerate the 100 mg dosage of trazodone as it was causing severe headaches for her and was not improving her sleep. She did decrease the dosage to 50 mg again but reports she was having the same symptoms so she stopped taking the medication. The patient does report she is still having intermittent episodes of anxiety especially when attempting to sleep at night. She reports that she does have racing thoughts at night which make it difficult for her to fall asleep.        Past Medical History:   Diagnosis Date   • COVID-19 01/2022   • Fatigue     last assessed: 3/20/17   • Fracture of humerus    • GERD (gastroesophageal reflux disease)     last assessed: 6/21/17   • Ovarian cyst 2021   • Urinary tract infection        Past Surgical History:   Procedure Laterality Date   • EGD  05/2020   • GALLBLADDER SURGERY  02/2019   • WISDOM TOOTH EXTRACTION         Current Outpatient Medications   Medication Sig Dispense Refill   • Biotin 1 MG CAPS Take 1 capsule by mouth daily     • cholecalciferol (VITAMIN D3) 1,000 units tablet Take 1,000 Units by mouth daily     • co-enzyme Q-10 30 MG capsule Take 30 mg by mouth daily     • folic acid (FOLVITE) 1 mg tablet Take 1 mg by mouth daily     • LORazepam (Ativan) 0.5 mg tablet Take 1 tablet (0.5 mg total) by mouth daily at bedtime as needed for anxiety (Insomnia) 30 tablet 0   • naproxen sodium (ANAPROX) 550 mg tablet Take 550 mg by mouth       No current facility-administered medications for this visit. Allergies   Allergen Reactions   • Chocolate - Food Allergy Abdominal Pain     + allergy testing   • Gluten Meal - Food Allergy Abdominal Pain   • Lactose - Food Allergy Abdominal Pain   • Other Abdominal Pain and Other (See Comments)     + allergy testing   + allergy testing    • Penicillins Rash       Review of Systems   Constitutional: Negative for chills and fever. HENT: Negative for ear pain and sore throat. Eyes: Negative for pain and visual disturbance. Respiratory: Negative for cough, chest tightness, shortness of breath and wheezing. Cardiovascular: Negative for chest pain, palpitations and leg swelling. Gastrointestinal: Negative for abdominal pain, constipation, diarrhea, nausea and vomiting. Endocrine: Negative for cold intolerance and heat intolerance. Genitourinary: Negative for decreased urine volume, dysuria and hematuria. Musculoskeletal: Negative for arthralgias, back pain and myalgias. Skin: Negative for color change and rash. Allergic/Immunologic: Negative for environmental allergies. Neurological: Negative for dizziness, seizures, syncope, weakness, light-headedness, numbness and headaches. Hematological: Negative for adenopathy. Psychiatric/Behavioral: Positive for sleep disturbance (insomnia ). Negative for confusion, decreased concentration, dysphoric mood, self-injury and suicidal ideas. The patient is nervous/anxious. All other systems reviewed and are negative. Video Exam    Vitals:       Physical Exam  Vitals reviewed: limited due to video visit. Constitutional:       General: She is not in acute distress. Appearance: Normal appearance. She is not ill-appearing. Neurological:      Mental Status: She is alert.           Visit Time  Total Visit Duration: 15 minutes

## 2023-08-23 NOTE — PATIENT INSTRUCTIONS
Problem List Items Addressed This Visit          Other    Anxiety     Ativan 0.5 mg was ordered to be taken at bedtime as needed to help with anxiety symptoms. Relevant Medications    LORazepam (Ativan) 0.5 mg tablet    Psychophysiological insomnia - Primary     Ativan 0.5 mg was ordered to be taken at bedtime to help improve insomnia symptoms. PDMP was reviewed and no red flags were noted. I did speak with patient about the addiction potential of the medication as well as the importance of safely storing the medication and not abusing the medication. I will have patient follow-up in 1 month to reassess her symptoms.          Relevant Medications    LORazepam (Ativan) 0.5 mg tablet

## 2023-08-24 ENCOUNTER — TELEPHONE (OUTPATIENT)
Dept: FAMILY MEDICINE CLINIC | Facility: CLINIC | Age: 23
End: 2023-08-24

## 2023-08-24 DIAGNOSIS — F41.9 ANXIETY: ICD-10-CM

## 2023-08-24 DIAGNOSIS — F51.04 PSYCHOPHYSIOLOGICAL INSOMNIA: ICD-10-CM

## 2023-08-24 RX ORDER — LORAZEPAM 0.5 MG/1
0.5 TABLET ORAL
Qty: 30 TABLET | Refills: 0 | Status: SHIPPED | OUTPATIENT
Start: 2023-08-24

## 2023-08-24 NOTE — TELEPHONE ENCOUNTER
Patient called into the office in regards of her medication lorazepam. Patient would like to know if Natanael Nguyen can send a new script to [facility] on [facility] he mention the pharmacy is was originally sent to doesn't carry it. Please advise. Thank you.

## 2023-10-11 DIAGNOSIS — F51.04 PSYCHOPHYSIOLOGICAL INSOMNIA: Primary | ICD-10-CM

## 2023-10-11 RX ORDER — ZOLPIDEM TARTRATE 5 MG/1
5 TABLET ORAL
Qty: 30 TABLET | Refills: 0 | Status: SHIPPED | OUTPATIENT
Start: 2023-10-11

## 2024-01-05 ENCOUNTER — OFFICE VISIT (OUTPATIENT)
Dept: FAMILY MEDICINE CLINIC | Facility: CLINIC | Age: 24
End: 2024-01-05
Payer: COMMERCIAL

## 2024-01-05 VITALS
TEMPERATURE: 97.5 F | DIASTOLIC BLOOD PRESSURE: 70 MMHG | BODY MASS INDEX: 22.36 KG/M2 | WEIGHT: 122.25 LBS | SYSTOLIC BLOOD PRESSURE: 110 MMHG | HEART RATE: 80 BPM | OXYGEN SATURATION: 98 %

## 2024-01-05 DIAGNOSIS — F51.04 PSYCHOPHYSIOLOGICAL INSOMNIA: ICD-10-CM

## 2024-01-05 DIAGNOSIS — G44.219 EPISODIC TENSION-TYPE HEADACHE, NOT INTRACTABLE: Primary | ICD-10-CM

## 2024-01-05 DIAGNOSIS — F41.9 ANXIETY: ICD-10-CM

## 2024-01-05 PROCEDURE — 99214 OFFICE O/P EST MOD 30 MIN: CPT | Performed by: NURSE PRACTITIONER

## 2024-01-05 RX ORDER — ZOLPIDEM TARTRATE 6.25 MG/1
6.25 TABLET, FILM COATED, EXTENDED RELEASE ORAL
Qty: 30 TABLET | Refills: 0 | Status: SHIPPED | OUTPATIENT
Start: 2024-01-05

## 2024-01-05 RX ORDER — BUPROPION HYDROCHLORIDE 150 MG/1
150 TABLET ORAL EVERY MORNING
Qty: 30 TABLET | Refills: 1 | Status: SHIPPED | OUTPATIENT
Start: 2024-01-05

## 2024-01-05 NOTE — PROGRESS NOTES
Name: Maci Padilla      : 2000      MRN: [id number]  Encounter Provider: TONI Ibarra  Encounter Date: 2024   Encounter department: [facility] PRIMARY [facility]    Assessment & Plan     1. Episodic tension-type headache, not intractable  Assessment & Plan:  I feel that patient's recurrent headaches are most likely related to uncontrolled anxiety as she does have a noted history of anxiety which she does report having daily while in school.  I will trial patient on Wellbutrin  mg daily to see if this better controls her anxiety and in turn decreases her headaches.  However, since her hip pain has been occurring for a few years at this point I do feel that an MRI is warranted to rule out any acute intracranial abnormalities.  MRI of the brain was ordered to be completed.    Orders:  -     MRI brain wo contrast; Future; Expected date: 2024    2. Anxiety  Assessment & Plan:  Wellbutrin 150 mg was ordered to be taken daily for treatment of uncontrolled anxiety.  Patient was advised that can take 2 to 4 weeks to see true effect from the medication.  I will follow-up with the patient in 1 month to reassess her symptoms.    Orders:  -     buPROPion (WELLBUTRIN XL) 150 mg 24 hr tablet; Take 1 tablet (150 mg total) by mouth every morning    3. Psychophysiological insomnia  Assessment & Plan:  Patient was started on Ambien CR 6.25 mg at bedtime to hopefully help her better fall asleep and stay asleep throughout the night.  I will reassess patient's symptoms in 1 month.    Orders:  -     zolpidem (AMBIEN CR) 6.25 MG CR tablet; Take 1 tablet (6.25 mg total) by mouth daily at bedtime as needed for sleep           Subjective      Headaches: Patient reports over the past 3 years she has been experiencing recurrent episodes of sharp, shooting pains in her head.  She reports that sometimes these episodes will last for few minutes but other times it can last for over an hour.  She reports that the  pain is localized to her forehead and her bilateral occipital areas generally.  She reports that often the pain results on its own but sometimes she must take ibuprofen to help with the pain.  She denies any associated neurological changes during these episodes.  Patient does report often feeling anxiety during the episodes.  The patient reports that these episodes happen a few times per month.    Insomnia: Patient is currently managed on Ambien 5 mg at bedtime.  The patient reports that initially the medication was helping her fall asleep but she has been having difficulty staying asleep.  She also reports that she discontinued the medication for 2 weeks and when she restarted it it stopped helping her fall asleep as well.          Review of Systems   Constitutional:  Negative for chills and fever.   HENT:  Negative for ear pain and sore throat.    Eyes:  Negative for pain and visual disturbance.   Respiratory:  Negative for cough, chest tightness, shortness of breath and wheezing.    Cardiovascular:  Negative for chest pain, palpitations and leg swelling.   Gastrointestinal:  Negative for abdominal pain, constipation, diarrhea, nausea and vomiting.   Endocrine: Negative for cold intolerance and heat intolerance.   Genitourinary:  Negative for decreased urine volume, dysuria and hematuria.   Musculoskeletal:  Negative for arthralgias, back pain and myalgias.   Skin:  Negative for color change and rash.   Allergic/Immunologic: Negative for environmental allergies.   Neurological:  Positive for headaches (recurrent episodic head pain). Negative for dizziness, tremors, seizures, syncope, facial asymmetry, speech difficulty, weakness, light-headedness and numbness.   Hematological:  Negative for adenopathy.   Psychiatric/Behavioral:  Positive for sleep disturbance (insomnia). Negative for confusion, dysphoric mood, self-injury and suicidal ideas. The patient is nervous/anxious.    All other systems reviewed and are  negative.      Current Outpatient Medications on File Prior to Visit   Medication Sig   • Biotin 1 MG CAPS Take 1 capsule by mouth daily   • cholecalciferol (VITAMIN D3) 1,000 units tablet Take 1,000 Units by mouth daily   • co-enzyme Q-10 30 MG capsule Take 30 mg by mouth daily   • folic acid (FOLVITE) 1 mg tablet Take 1 mg by mouth daily   • naproxen sodium (ANAPROX) 550 mg tablet Take 550 mg by mouth   • [DISCONTINUED] zolpidem (AMBIEN) 5 mg tablet Take 1 tablet (5 mg total) by mouth daily at bedtime as needed for sleep       Objective     /70 (BP Location: Right arm, Patient Position: Sitting, Cuff Size: Standard)   Pulse 80   Temp 97.5 °F (36.4 °C) (Temporal)   Wt 55.5 kg (122 lb 4 oz)   SpO2 98%   BMI 22.36 kg/m²     Physical Exam  Vitals and nursing note reviewed.   Constitutional:       General: She is not in acute distress.     Appearance: Normal appearance. She is not ill-appearing.   HENT:      Head: Normocephalic.   Eyes:      Extraocular Movements: Extraocular movements intact.      Right eye: Normal extraocular motion and no nystagmus.      Left eye: Normal extraocular motion and no nystagmus.      Conjunctiva/sclera: Conjunctivae normal.      Pupils: Pupils are equal, round, and reactive to light. Pupils are equal.   Cardiovascular:      Rate and Rhythm: Normal rate and regular rhythm.      Pulses: Normal pulses.           Carotid pulses are 2+ on the right side and 2+ on the left side.       Radial pulses are 2+ on the right side and 2+ on the left side.        Posterior tibial pulses are 2+ on the right side and 2+ on the left side.      Heart sounds: Normal heart sounds. No murmur heard.  Pulmonary:      Effort: Pulmonary effort is normal. No respiratory distress.      Breath sounds: Normal breath sounds. No decreased breath sounds, wheezing, rhonchi or rales.   Abdominal:      General: Abdomen is flat. Bowel sounds are normal. There is no distension.      Palpations: Abdomen is soft.       Tenderness: There is no abdominal tenderness. There is no guarding.   Musculoskeletal:         General: Normal range of motion.      Cervical back: Normal range of motion.      Right lower leg: No edema.      Left lower leg: No edema.   Skin:     General: Skin is warm and dry.      Capillary Refill: Capillary refill takes less than 2 seconds.   Neurological:      General: No focal deficit present.      Mental Status: She is alert and oriented to person, place, and time.      Cranial Nerves: Cranial nerves 2-12 are intact.      Sensory: Sensation is intact.      Motor: Motor function is intact.      Coordination: Coordination is intact.      Gait: Gait is intact.   Psychiatric:         Mood and Affect: Mood normal.         Behavior: Behavior normal.         Thought Content: Thought content normal.         Judgment: Judgment normal.       TONI Ibarra

## 2024-01-05 NOTE — ASSESSMENT & PLAN NOTE
Patient was started on Ambien CR 6.25 mg at bedtime to hopefully help her better fall asleep and stay asleep throughout the night.  I will reassess patient's symptoms in 1 month.

## 2024-01-05 NOTE — ASSESSMENT & PLAN NOTE
I feel that patient's recurrent headaches are most likely related to uncontrolled anxiety as she does have a noted history of anxiety which she does report having daily while in school.  I will trial patient on Wellbutrin  mg daily to see if this better controls her anxiety and in turn decreases her headaches.  However, since her hip pain has been occurring for a few years at this point I do feel that an MRI is warranted to rule out any acute intracranial abnormalities.  MRI of the brain was ordered to be completed.

## 2024-01-05 NOTE — ASSESSMENT & PLAN NOTE
Wellbutrin 150 mg was ordered to be taken daily for treatment of uncontrolled anxiety.  Patient was advised that can take 2 to 4 weeks to see true effect from the medication.  I will follow-up with the patient in 1 month to reassess her symptoms.

## 2024-01-16 DIAGNOSIS — F51.04 PSYCHOPHYSIOLOGICAL INSOMNIA: Primary | ICD-10-CM

## 2024-02-01 DIAGNOSIS — F41.9 ANXIETY: ICD-10-CM

## 2024-02-01 RX ORDER — BUPROPION HYDROCHLORIDE 150 MG/1
150 TABLET ORAL EVERY MORNING
Qty: 90 TABLET | Refills: 1 | Status: SHIPPED | OUTPATIENT
Start: 2024-02-01

## 2024-02-03 ENCOUNTER — NURSE TRIAGE (OUTPATIENT)
Dept: OTHER | Facility: OTHER | Age: 24
End: 2024-02-03

## 2024-02-03 NOTE — TELEPHONE ENCOUNTER
"Reason for Disposition  • Prescription request for new medicine (not a refill)    Answer Assessment - Initial Assessment Questions  1. NAME of MEDICATION: \"What medicine are you calling about?\"      Belsomra     2. QUESTION: \"What is your question?\" (e.g., medication refill, side effect)      Order was not sent to pharmacy.    3. PRESCRIBING HCP: \"Who prescribed it?\" Reason: if prescribed by specialist, call should be referred to that group.      PCP    Protocols used: Medication Question Call-ADULT-    "

## 2024-02-03 NOTE — TELEPHONE ENCOUNTER
"Regarding: Medication Problem  ----- Message from Vera Molina sent at 2/3/2024 12:32 PM EST -----  \" My Dr was supposed to send belsomra over to the pharmacy for me. He sent Wellbutrin instead and I told him I didn't want to take that anymore. Is there anyway I can get something else sent. I am out of my medications.\"    Pt would like them sent to the pharmacy near her school    "

## 2024-02-05 DIAGNOSIS — F51.04 PSYCHOPHYSIOLOGICAL INSOMNIA: Primary | ICD-10-CM

## 2024-02-05 RX ORDER — ESZOPICLONE 1 MG/1
1 TABLET, FILM COATED ORAL
Qty: 30 TABLET | Refills: 0 | Status: SHIPPED | OUTPATIENT
Start: 2024-02-05

## 2024-02-05 NOTE — TELEPHONE ENCOUNTER
Patient Quality Outreach    Patient is due for the following:   Physical Preventive Adult Physical    Next Steps:   Schedule a Adult Preventative    Type of outreach:    Sent letter.    Next Steps:  Reach out within 90 days via Letter.    Max number of attempts reached: Yes. Will try again in 90 days if patient still on fail list.    Questions for provider review:    None           Last Christopher MA              Pt will like belsomra to be send to her pharmacy closer to her school already change on her chart .

## 2024-02-05 NOTE — TELEPHONE ENCOUNTER
Please confirm with patient what medications she would like to trial at this point.  Is it just the Belsomra?  I never received a response as to whether or not she wanted to start the medication.

## 2024-02-05 NOTE — TELEPHONE ENCOUNTER
Belsomra was not covered by the patient's insurance so Lunesta was ordered instead.  Please also ask the patient if she is still continuing to take Wellbutrin at this time.

## 2024-02-06 NOTE — TELEPHONE ENCOUNTER
I received a patient message from the patient previously stating that she no longer wanted to take the Wellbutrin.  Did she call for a refill of the medication or was it automatically refilled?

## 2024-05-16 ENCOUNTER — TELEPHONE (OUTPATIENT)
Age: 24
End: 2024-05-16

## 2024-05-16 DIAGNOSIS — F51.04 PSYCHOPHYSIOLOGICAL INSOMNIA: Primary | ICD-10-CM

## 2024-05-16 RX ORDER — ZOLPIDEM TARTRATE 12.5 MG/1
12.5 TABLET, FILM COATED, EXTENDED RELEASE ORAL
Qty: 30 TABLET | Refills: 0 | Status: SHIPPED | OUTPATIENT
Start: 2024-05-16

## 2024-05-16 NOTE — TELEPHONE ENCOUNTER
Received call from patient with questions about taking zolpidem.  This medication is not on her current medication list.  She states that she did not start taking Lunesta because she was concerned about side effects and  she is still taking zolpidem 6.25 mg for sleep.  States that this helps her to sleep but not consistently depending on how stressed she is.  She is asking if there is a stronger dose of zolpidem that she could take.  Please advise.

## 2024-05-16 NOTE — TELEPHONE ENCOUNTER
There is a 12.5 mg dosage of Ambien controlled release.  Does the patient currently have the extended release version of Ambien on hand?

## 2024-05-16 NOTE — TELEPHONE ENCOUNTER
Ambien controlled release 12.5 mg was ordered for the patient to be taken at bedtime as needed.  She can continue to double up on the 6.25 mg tablets until she runs out and then  the new dose.

## 2024-05-16 NOTE — TELEPHONE ENCOUNTER
Pt states she has 4 pills left of the extended release. If you are going to send in the 12.5 send it to [facility]. Pt just wants to make sure you feel it is safe for her to take.

## 2024-06-06 ENCOUNTER — TELEPHONE (OUTPATIENT)
Age: 24
End: 2024-06-06

## 2024-06-06 DIAGNOSIS — H57.13: Primary | ICD-10-CM

## 2024-06-06 NOTE — TELEPHONE ENCOUNTER
Order faxed and left mess notifying pt  
Pt is scheduled for MRI on Wed 6/12 and they stated she needs to get a Orbitual Xray done on Monday.    Please fax referral to  [facility]  [phone]    Please advise [phone] when faxed.  
X-ray of the orbits was ordered to be completed.  
No specific activity orders./yes

## 2024-07-30 ENCOUNTER — TELEPHONE (OUTPATIENT)
Age: 24
End: 2024-07-30

## 2024-07-30 NOTE — TELEPHONE ENCOUNTER
Patient called requesting TB/PPD vaccine(s) for the following reason(s): school.     Appt date: 8/6/24    No order in system. Please advise and/or schedule accordingly.

## 2024-08-06 ENCOUNTER — OFFICE VISIT (OUTPATIENT)
Dept: FAMILY MEDICINE CLINIC | Facility: CLINIC | Age: 24
End: 2024-08-06
Payer: COMMERCIAL

## 2024-08-06 VITALS
WEIGHT: 115 LBS | SYSTOLIC BLOOD PRESSURE: 90 MMHG | BODY MASS INDEX: 21.16 KG/M2 | HEART RATE: 67 BPM | DIASTOLIC BLOOD PRESSURE: 72 MMHG | HEIGHT: 62 IN | OXYGEN SATURATION: 98 %

## 2024-08-06 DIAGNOSIS — Z01.84 IMMUNITY STATUS TESTING: ICD-10-CM

## 2024-08-06 DIAGNOSIS — Z00.00 ANNUAL PHYSICAL EXAM: Primary | ICD-10-CM

## 2024-08-06 DIAGNOSIS — Z11.1 SCREENING FOR TUBERCULOSIS: ICD-10-CM

## 2024-08-06 DIAGNOSIS — F41.9 ANXIETY: ICD-10-CM

## 2024-08-06 DIAGNOSIS — F51.04 PSYCHOPHYSIOLOGICAL INSOMNIA: ICD-10-CM

## 2024-08-06 DIAGNOSIS — Z23 ENCOUNTER FOR IMMUNIZATION: ICD-10-CM

## 2024-08-06 PROCEDURE — 86580 TB INTRADERMAL TEST: CPT

## 2024-08-06 PROCEDURE — 90715 TDAP VACCINE 7 YRS/> IM: CPT

## 2024-08-06 PROCEDURE — 90471 IMMUNIZATION ADMIN: CPT

## 2024-08-06 PROCEDURE — 99214 OFFICE O/P EST MOD 30 MIN: CPT | Performed by: NURSE PRACTITIONER

## 2024-08-06 PROCEDURE — 99395 PREV VISIT EST AGE 18-39: CPT | Performed by: NURSE PRACTITIONER

## 2024-08-06 RX ORDER — ZOLPIDEM TARTRATE 12.5 MG/1
12.5 TABLET, FILM COATED, EXTENDED RELEASE ORAL
Qty: 30 TABLET | Refills: 0 | Status: SHIPPED | OUTPATIENT
Start: 2024-08-06

## 2024-08-06 RX ORDER — HYDROXYZINE HYDROCHLORIDE 25 MG/1
25 TABLET, FILM COATED ORAL EVERY 6 HOURS PRN
COMMUNITY
End: 2024-08-06

## 2024-08-06 RX ORDER — ESCITALOPRAM OXALATE 10 MG/1
10 TABLET ORAL DAILY
Qty: 30 TABLET | Refills: 1 | Status: SHIPPED | OUTPATIENT
Start: 2024-08-06

## 2024-08-06 RX ORDER — TRAZODONE HYDROCHLORIDE 50 MG/1
50 TABLET ORAL
COMMUNITY
End: 2024-08-06

## 2024-08-06 NOTE — PROGRESS NOTES
"Ambulatory Visit  Name: Maci Padilla      : 2000      MRN: [id number]  Encounter Provider: TONI Cantu  Encounter Date: 2024   Encounter department: [facility] PRIMARY [facility]ARE    Assessment & Plan   1. Screening for tuberculosis       History of Present Illness   {Disappearing Hyperlinks I Encounters * My Last Note * Since Last Visit * History :08140}  HPI    Review of Systems  {Select to Display PMH (Optional):61030}  Objective   {Disappearing Hyperlinks   Review Vitals * Enter New Vitals * Results Review * Labs * Imaging * Cardiology * Procedures * Lung Cancer Screening :98301}  BP 90/72 (BP Location: Right arm, Patient Position: Sitting, Cuff Size: Standard)   Pulse 67   Ht 5' 2\" (1.575 m)   Wt 52.2 kg (115 lb)   SpO2 98%   BMI 21.03 kg/m²     Physical Exam  Administrative Statements {Disappearing Hyperlinks I  Level of Service * PCMH/PCSP:07189}  {Time Spent Statement (Optional):37563}  "

## 2024-08-06 NOTE — ASSESSMENT & PLAN NOTE
Patient will be trialed on Lexapro 10 mg daily for treatment of her anxiety.  I did speak with patient about common side effects of the medication and did advise her that it can take 2 to 4 weeks to see true effect from the medication.  I will follow-up with her in 1 month to reassess her symptoms.

## 2024-08-06 NOTE — PROGRESS NOTES
Adult Annual Physical  Name: Maci Padilla      : 2000      MRN: [id number]  Encounter Provider: TONI Cantu  Encounter Date: 2024   Encounter department: [facility] PRIMARY [facility]    Assessment & Plan   1. Annual physical exam  2. Screening for tuberculosis  -     TB Skin Test  3. Encounter for immunization  -     TDAP VACCINE GREATER THAN OR EQUAL TO 6YO IM  4. Immunity status testing  -     Measles/Mumps/Rubella Immunity; Future  5. Anxiety  Assessment & Plan:  Patient will be trialed on Lexapro 10 mg daily for treatment of her anxiety.  I did speak with patient about common side effects of the medication and did advise her that it can take 2 to 4 weeks to see true effect from the medication.  I will follow-up with her in 1 month to reassess her symptoms.  Orders:  -     escitalopram (LEXAPRO) 10 mg tablet; Take 1 tablet (10 mg total) by mouth daily  6. Psychophysiological insomnia  Assessment & Plan:  Well-controlled with as needed use of Ambien.  Orders:  -     zolpidem (AMBIEN CR) 12.5 MG CR tablet; Take 1 tablet (12.5 mg total) by mouth daily at bedtime as needed for sleep    Immunizations and preventive care screenings were discussed with patient today. Appropriate education was printed on patient's after visit summary.    Counseling:  Alcohol/drug use: discussed moderation in alcohol intake, the recommendations for healthy alcohol use, and avoidance of illicit drug use.  Dental Health: discussed importance of regular tooth brushing, flossing, and dental visits.  Injury prevention: discussed safety/seat belts, safety helmets, smoke detectors, carbon dioxide detectors, and smoking near bedding or upholstery.  Sexual health: discussed sexually transmitted diseases, partner selection, use of condoms, avoidance of unintended pregnancy, and contraceptive alternatives.  Exercise: the importance of regular exercise/physical activity was discussed. Recommend exercise 3-5 times per  week for at least 30 minutes.     Insomnia: Well-controlled with as needed use of Ambien.    Anxiety: The patient was previously prescribed Wellbutrin for treatment of her anxiety however, she never started the medication due to concerns over side effects.  She is interested in trialing another medication for her anxiety at this point.  Depression Screening and Follow-up Plan: Patient was screened for depression during today's encounter. They screened negative with a PHQ-2 score of 0.        History of Present Illness     Adult Annual Physical:  Patient presents for annual physical.     Diet and Physical Activity:  - Diet/Nutrition: well balanced diet, consuming 3-5 servings of fruits/vegetables daily and limited junk food.  - Exercise: moderate cardiovascular exercise, walking, strength training exercises, 1-2 times a week on average and 30-60 minutes on average.    Depression Screening:  - PHQ-2 Score: 0  - PHQ-9 Score: 0    General Health:  - Sleep: 7-8 hours of sleep on average and sleeps well.  - Hearing: normal hearing bilateral ears.  - Vision: wears glasses, wears contacts, goes for regular eye exams and most recent eye exam < 1 year ago.  - Dental: regular dental visits and brushes teeth twice daily.    /GYN Health:  - Follows with GYN: yes.   - Menopause: premenopausal.   - Last menstrual cycle: 7/30/2024.   - History of STDs: no    Advanced Care Planning:  - Has an advanced directive?: no    - Has a durable medical POA?: no    - ACP document given to patient?: no      Review of Systems   Constitutional:  Negative for chills and fever.   HENT:  Negative for ear pain and sore throat.    Eyes:  Negative for pain and visual disturbance.   Respiratory:  Negative for cough, chest tightness, shortness of breath and wheezing.    Cardiovascular:  Negative for chest pain, palpitations and leg swelling.   Gastrointestinal:  Negative for abdominal pain, constipation, diarrhea, nausea and vomiting.   Endocrine:  "Negative for cold intolerance and heat intolerance.   Genitourinary:  Negative for decreased urine volume, dysuria and hematuria.   Musculoskeletal:  Negative for arthralgias, back pain and myalgias.   Skin:  Negative for color change and rash.   Allergic/Immunologic: Negative for environmental allergies.   Neurological:  Negative for dizziness, seizures, syncope, weakness, light-headedness, numbness and headaches.   Hematological:  Negative for adenopathy.   Psychiatric/Behavioral:  Positive for sleep disturbance (insomnia-improved). Negative for confusion, dysphoric mood, self-injury and suicidal ideas. The patient is nervous/anxious.    All other systems reviewed and are negative.        Objective     BP 90/72 (BP Location: Right arm, Patient Position: Sitting, Cuff Size: Standard)   Pulse 67   Ht 5' 2\" (1.575 m)   Wt 52.2 kg (115 lb)   SpO2 98%   BMI 21.03 kg/m²     Physical Exam  Vitals and nursing note reviewed.   Constitutional:       General: She is not in acute distress.     Appearance: Normal appearance. She is well-developed. She is not ill-appearing.   HENT:      Head: Normocephalic.   Eyes:      Conjunctiva/sclera: Conjunctivae normal.   Cardiovascular:      Rate and Rhythm: Normal rate and regular rhythm.      Pulses: Normal pulses.           Carotid pulses are 2+ on the right side and 2+ on the left side.       Radial pulses are 2+ on the right side and 2+ on the left side.        Posterior tibial pulses are 2+ on the right side and 2+ on the left side.      Heart sounds: Normal heart sounds. No murmur heard.  Pulmonary:      Effort: Pulmonary effort is normal. No respiratory distress.      Breath sounds: Normal breath sounds. No decreased breath sounds, wheezing, rhonchi or rales.   Abdominal:      General: Abdomen is flat. Bowel sounds are normal. There is no distension.      Palpations: Abdomen is soft.      Tenderness: There is no abdominal tenderness. There is no guarding.   Musculoskeletal: "         General: No swelling. Normal range of motion.      Cervical back: Normal range of motion and neck supple.      Right lower leg: No edema.      Left lower leg: No edema.   Skin:     General: Skin is warm and dry.      Capillary Refill: Capillary refill takes less than 2 seconds.   Neurological:      General: No focal deficit present.      Mental Status: She is alert and oriented to person, place, and time.   Psychiatric:         Mood and Affect: Mood normal.         Behavior: Behavior normal.         Thought Content: Thought content normal.         Judgment: Judgment normal.

## 2024-08-06 NOTE — PATIENT INSTRUCTIONS
"Patient Education     Routine physical for adults   The Basics   Written by the doctors and editors at [facility]   What is a physical? -- A physical is a routine visit, or \"check-up,\" with your doctor. You might also hear it called a \"wellness visit\" or \"preventive visit.\"  During each visit, the doctor will:   Ask about your physical and mental health   Ask about your habits, behaviors, and lifestyle   Do an exam   Give you vaccines if needed   Talk to you about any medicines you take   Give advice about your health   Answer your questions  Getting regular check-ups is an important part of taking care of your health. It can help your doctor find and treat any problems you have. But it's also important for preventing health problems.  A routine physical is different from a \"sick visit.\" A sick visit is when you see a doctor because of a health concern or problem. Since physicals are scheduled ahead of time, you can think about what you want to ask the doctor.  How often should I get a physical? -- It depends on your age and health. In general, for people age 21 years and older:   If you are younger than 50 years, you might be able to get a physical every 3 years.   If you are 50 years or older, your doctor might recommend a physical every year.  If you have an ongoing health condition, like diabetes or high blood pressure, your doctor will probably want to see you more often.  What happens during a physical? -- In general, each visit will include:   Physical exam - The doctor or nurse will check your height, weight, heart rate, and blood pressure. They will also look at your eyes and ears. They will ask about how you are feeling and whether you have any symptoms that bother you.   Medicines - It's a good idea to bring a list of all the medicines you take to each doctor visit. Your doctor will talk to you about your medicines and answer any questions. Tell them if you are having any side effects that bother you. You " "should also tell them if you are having trouble paying for any of your medicines.   Habits and behaviors - This includes:   Your diet   Your exercise habits   Whether you smoke, drink alcohol, or use drugs   Whether you are sexually active   Whether you feel safe at home  Your doctor will talk to you about things you can do to improve your health and lower your risk of health problems. They will also offer help and support. For example, if you want to quit smoking, they can give you advice and might prescribe medicines. If you want to improve your diet or get more physical activity, they can help you with this, too.   Lab tests, if needed - The tests you get will depend on your age and situation. For example, your doctor might want to check your:   Cholesterol   Blood sugar   Iron level   Vaccines - The recommended vaccines will depend on your age, health, and what vaccines you already had. Vaccines are very important because they can prevent certain serious or deadly infections.   Discussion of screening - \"Screening\" means checking for diseases or other health problems before they cause symptoms. Your doctor can recommend screening based on your age, risk, and preferences. This might include tests to check for:   Cancer, such as breast, prostate, cervical, ovarian, colorectal, prostate, lung, or skin cancer   Sexually transmitted infections, such as chlamydia and gonorrhea   Mental health conditions like depression and anxiety  Your doctor will talk to you about the different types of screening tests. They can help you decide which screenings to have. They can also explain what the results might mean.   Answering questions - The physical is a good time to ask the doctor or nurse questions about your health. If needed, they can refer you to other doctors or specialists, too.  Adults older than 65 years often need other care, too. As you get older, your doctor will talk to you about:   How to prevent falling at " home   Hearing or vision tests   Memory testing   How to take your medicines safely   Making sure that you have the help and support you need at home  All topics are updated as new evidence becomes available and our peer review process is complete.  This topic retrieved from [employer] on: May 02, 2024.  Topic 603189 Version 1.0  Release: 32.4.3 - C32.122  © 2024 UpToDate, Inc. and/or its affiliates. All rights reserved.  Consumer Information Use and Disclaimer   Disclaimer: This generalized information is a limited summary of diagnosis, treatment, and/or medication information. It is not meant to be comprehensive and should be used as a tool to help the user understand and/or assess potential diagnostic and treatment options. It does NOT include all information about conditions, treatments, medications, side effects, or risks that may apply to a specific patient. It is not intended to be medical advice or a substitute for the medical advice, diagnosis, or treatment of a health care provider based on the health care provider's examination and assessment of a patient's specific and unique circumstances. Patients must speak with a health care provider for complete information about their health, medical questions, and treatment options, including any risks or benefits regarding use of medications. This information does not endorse any treatments or medications as safe, effective, or approved for treating a specific patient. UpToDate, Inc. and its affiliates disclaim any warranty or liability relating to this information or the use thereof.The use of this information is governed by the Terms of Use, available at https://www.woltersnfonuwer.com/en/know/clinical-effectiveness-terms. 2024© UpToDate, Inc. and its affiliates and/or licensors. All rights reserved.  Copyright   © 2024 UpToDate, Inc. and/or its affiliates. All rights reserved.

## 2024-08-08 ENCOUNTER — CLINICAL SUPPORT (OUTPATIENT)
Dept: FAMILY MEDICINE CLINIC | Facility: CLINIC | Age: 24
End: 2024-08-08

## 2024-08-08 DIAGNOSIS — Z11.1 ENCOUNTER FOR PPD SKIN TEST READING: Primary | ICD-10-CM

## 2024-08-08 LAB
INDURATION: 0 MM
TB SKIN TEST: NEGATIVE

## 2024-08-08 NOTE — PROGRESS NOTES
PPD Reading Note  PPD read and results entered in [facility].  Result: 0 mm induration.  Interpretation: Neg  If test not read within 48-72 hours of initial placement, patient advised to repeat in other arm 1-3 weeks after this test.  Allergic reaction: no

## 2024-09-03 ENCOUNTER — TELEMEDICINE (OUTPATIENT)
Dept: FAMILY MEDICINE CLINIC | Facility: CLINIC | Age: 24
End: 2024-09-03
Payer: COMMERCIAL

## 2024-09-03 VITALS — BODY MASS INDEX: 21.16 KG/M2 | HEIGHT: 62 IN | WEIGHT: 115 LBS

## 2024-09-03 DIAGNOSIS — F51.04 PSYCHOPHYSIOLOGICAL INSOMNIA: ICD-10-CM

## 2024-09-03 DIAGNOSIS — F41.9 ANXIETY: Primary | ICD-10-CM

## 2024-09-03 PROCEDURE — 99214 OFFICE O/P EST MOD 30 MIN: CPT | Performed by: NURSE PRACTITIONER

## 2024-09-03 PROCEDURE — 3725F SCREEN DEPRESSION PERFORMED: CPT | Performed by: NURSE PRACTITIONER

## 2024-09-03 RX ORDER — DULOXETIN HYDROCHLORIDE 30 MG/1
30 CAPSULE, DELAYED RELEASE ORAL DAILY
Qty: 30 CAPSULE | Refills: 1 | Status: SHIPPED | OUTPATIENT
Start: 2024-09-03

## 2024-09-03 NOTE — ASSESSMENT & PLAN NOTE
Lexapro was discontinued and patient will be trialed on Cymbalta 30 mg daily instead.  I will have patient return to the office in 1 month to reassess her symptoms.

## 2024-09-03 NOTE — PROGRESS NOTES
Virtual Regular Visit  Name: Maci Padilla      : 2000      MRN: [id number]  Encounter Provider: TONI Cantu  Encounter Date: 9/3/2024   Encounter department: [facility]    Verification of patient location:    Patient is located at Home in the following state in which I hold an active license [place]    Assessment & Plan   1. Anxiety  Assessment & Plan:  Lexapro was discontinued and patient will be trialed on Cymbalta 30 mg daily instead.  I will have patient return to the office in 1 month to reassess her symptoms.  Orders:  -     DULoxetine (CYMBALTA) 30 mg delayed release capsule; Take 1 capsule (30 mg total) by mouth daily  2. Psychophysiological insomnia  Assessment & Plan:  Well-controlled with as needed use of Ambien.       Encounter provider TONI Cantu    The patient was identified by name and date of birth. Maci Padilla was informed that this is a telemedicine visit and that the visit is being conducted through the Epic Embedded platform. She agrees to proceed..  My office door was closed. No one else was in the room.  She acknowledged consent and understanding of privacy and security of the video platform. The patient has agreed to participate and understands they can discontinue the visit at any time.    Patient is aware this is a billable service.     History of Present Illness     Anxiety: Patient was started on Lexapro 10 mg daily at her last office visit for treatment of uncontrolled anxiety symptoms.  The patient states that she has not noted much improvement in her anxiety and she has also been noting GI side effects.  She reports that she has felt frequently nauseous since beginning the medication and does often dry heave.  The patient is interested in trialing a different medication for her anxiety.    Insomnia: Well-controlled with as needed use of Ambien.        Review of Systems   Constitutional:  Negative for appetite change, chills and  "fever.   HENT:  Negative for ear pain and sore throat.    Eyes:  Negative for pain and visual disturbance.   Respiratory:  Negative for cough, chest tightness and shortness of breath.    Cardiovascular:  Negative for chest pain, palpitations and leg swelling.   Gastrointestinal:  Negative for abdominal pain, constipation, diarrhea, nausea and vomiting.   Endocrine: Negative for cold intolerance and heat intolerance.   Genitourinary:  Negative for decreased urine volume, difficulty urinating, dysuria and hematuria.   Musculoskeletal:  Negative for arthralgias, back pain and myalgias.   Skin:  Negative for color change, rash and wound.   Allergic/Immunologic: Negative for environmental allergies.   Neurological:  Negative for dizziness, seizures, syncope, light-headedness and headaches.   Hematological:  Negative for adenopathy.   Psychiatric/Behavioral:  Negative for dysphoric mood, self-injury, sleep disturbance and suicidal ideas. The patient is nervous/anxious.    All other systems reviewed and are negative.      Objective     Ht 5' 2\" (1.575 m)   Wt 52.2 kg (115 lb)   BMI 21.03 kg/m²   Physical Exam  Vitals reviewed: limited due to video vist.   Constitutional:       General: She is not in acute distress.     Appearance: Normal appearance. She is not ill-appearing.   Neurological:      Mental Status: She is alert.         Visit Time  Total Visit Duration: 15 minutes         "

## 2024-09-20 DIAGNOSIS — F51.04 PSYCHOPHYSIOLOGICAL INSOMNIA: ICD-10-CM

## 2024-09-23 RX ORDER — ZOLPIDEM TARTRATE 12.5 MG/1
12.5 TABLET, FILM COATED, EXTENDED RELEASE ORAL
Qty: 30 TABLET | Refills: 0 | Status: SHIPPED | OUTPATIENT
Start: 2024-09-23

## 2024-09-30 DIAGNOSIS — F41.9 ANXIETY: ICD-10-CM

## 2024-10-01 RX ORDER — DULOXETIN HYDROCHLORIDE 30 MG/1
30 CAPSULE, DELAYED RELEASE ORAL DAILY
Qty: 90 CAPSULE | Refills: 1 | Status: SHIPPED | OUTPATIENT
Start: 2024-10-01

## 2024-10-30 DIAGNOSIS — F51.04 PSYCHOPHYSIOLOGICAL INSOMNIA: ICD-10-CM

## 2024-10-30 RX ORDER — ZOLPIDEM TARTRATE 12.5 MG/1
12.5 TABLET, FILM COATED, EXTENDED RELEASE ORAL
Qty: 30 TABLET | Refills: 0 | Status: SHIPPED | OUTPATIENT
Start: 2024-10-30

## 2024-12-18 ENCOUNTER — OFFICE VISIT (OUTPATIENT)
Dept: FAMILY MEDICINE CLINIC | Facility: CLINIC | Age: 24
End: 2024-12-18
Payer: COMMERCIAL

## 2024-12-18 VITALS
OXYGEN SATURATION: 99 % | WEIGHT: 121 LBS | DIASTOLIC BLOOD PRESSURE: 60 MMHG | HEIGHT: 62 IN | BODY MASS INDEX: 22.26 KG/M2 | HEART RATE: 68 BPM | SYSTOLIC BLOOD PRESSURE: 90 MMHG

## 2024-12-18 DIAGNOSIS — F41.9 ANXIETY: Primary | ICD-10-CM

## 2024-12-18 DIAGNOSIS — L03.011 CELLULITIS OF FINGER OF RIGHT HAND: ICD-10-CM

## 2024-12-18 DIAGNOSIS — F51.04 PSYCHOPHYSIOLOGICAL INSOMNIA: ICD-10-CM

## 2024-12-18 PROCEDURE — 99214 OFFICE O/P EST MOD 30 MIN: CPT | Performed by: NURSE PRACTITIONER

## 2024-12-18 RX ORDER — BUSPIRONE HYDROCHLORIDE 5 MG/1
5 TABLET ORAL 2 TIMES DAILY
Qty: 60 TABLET | Refills: 1 | Status: SHIPPED | OUTPATIENT
Start: 2024-12-18

## 2024-12-18 RX ORDER — SULFAMETHOXAZOLE AND TRIMETHOPRIM 800; 160 MG/1; MG/1
1 TABLET ORAL EVERY 12 HOURS SCHEDULED
Qty: 14 TABLET | Refills: 0 | Status: SHIPPED | OUTPATIENT
Start: 2024-12-18 | End: 2024-12-25

## 2024-12-18 NOTE — ASSESSMENT & PLAN NOTE
7-day course of Bactrim was ordered for treatment of cellulitis of the finger.  She was advised to continue to use warm compresses on the area as well.  Orders:  •  sulfamethoxazole-trimethoprim (BACTRIM DS) 800-160 mg per tablet; Take 1 tablet by mouth every 12 (twelve) hours for 7 days

## 2024-12-18 NOTE — PATIENT INSTRUCTIONS
Manuela Lynch   [phone]  [address]  [address]  [place] [address]    [facility]  [address] [facility]   [place] [address]  [phone]    [facility]   [address] [facility] [place] [address]  [phone]    [place] [facility]   [phone]  [address]    [address]  [place] [address]     Nicolasa Butts  [address]  [address]  [place] [address]    [facility]   [address]   [address]  [place] [address]  [phone]    [facility]  [address]   [address]  [place] [address]  [phone]    [facility]   [address]  [address]  [place] [address]  [phone]    [facility]  [address]  [place] [address]  [phone]    [facility]  [address] [facility]  [place] [address]  [phone]      [facility]   [address]  [place] [address]   [phone]      [facility]   [address] [facility] [place] [address]  [phone]

## 2024-12-18 NOTE — ASSESSMENT & PLAN NOTE
Patient will be trialed on BuSpar 5 mg twice daily to hopefully control her anxiety symptoms.  Patient was advised that it can take 2 to 4 weeks to see true effect from the medication.  I will have patient return to the office in 1 month so I can reassess her symptoms.  Referral was also placed to psychiatry and the patient was given resources for psychiatric services in the [facility] on her AVS today.  Orders:  •  busPIRone (BUSPAR) 5 mg tablet; Take 1 tablet (5 mg total) by mouth 2 (two) times a day  •  Ambulatory referral to Psych Services; Future

## 2024-12-18 NOTE — PROGRESS NOTES
Name: Maci Padilla      : 2000      MRN: [id number]  Encounter Provider: TONI Cantu  Encounter Date: 2024   Encounter department: [facility]  :  Assessment & Plan  Anxiety  Patient will be trialed on BuSpar 5 mg twice daily to hopefully control her anxiety symptoms.  Patient was advised that it can take 2 to 4 weeks to see true effect from the medication.  I will have patient return to the office in 1 month so I can reassess her symptoms.  Referral was also placed to psychiatry and the patient was given resources for psychiatric services in the [facility] on her AVS today.  Orders:  •  busPIRone (BUSPAR) 5 mg tablet; Take 1 tablet (5 mg total) by mouth 2 (two) times a day  •  Ambulatory referral to Psych Services; Future    Cellulitis of finger of right hand  7-day course of Bactrim was ordered for treatment of cellulitis of the finger.  She was advised to continue to use warm compresses on the area as well.  Orders:  •  sulfamethoxazole-trimethoprim (BACTRIM DS) 800-160 mg per tablet; Take 1 tablet by mouth every 12 (twelve) hours for 7 days    Psychophysiological insomnia  Well-controlled on current regimen.              History of Present Illness     Anxiety: Patient was previously trialed on Cymbalta, Wellbutrin, Lexapro, and hydroxyzine for treatment of her uncontrolled anxiety however, these medications were all discontinued either due to side effects or them being ineffective at controlling her symptoms.  She reports that she does continue to have daily anxiety.  Her anxiety has not been as severe recently as she is on break from college however, she would like to trial another daily medication as she feels that her anxiety will increase when she returns to school.    Cellulitis of finger: Patient reports that over the past month her right third finger has been red, painful, and swollen.  She reports that recently the pain has decreased but the area has  "remained red and swollen.  She denies noting any warmth to touch or discharge from the affected area.    Insomnia: Well-controlled with as needed use of Ambien.          Review of Systems   Constitutional:  Negative for chills and fever.   HENT:  Negative for ear pain and sore throat.    Eyes:  Negative for pain and visual disturbance.   Respiratory:  Negative for cough, chest tightness, shortness of breath and wheezing.    Cardiovascular:  Negative for chest pain, palpitations and leg swelling.   Gastrointestinal:  Negative for abdominal pain, constipation, diarrhea, nausea and vomiting.   Endocrine: Negative for cold intolerance and heat intolerance.   Genitourinary:  Negative for decreased urine volume, dysuria and hematuria.   Musculoskeletal:  Negative for arthralgias, back pain and myalgias.   Skin:  Negative for color change and rash.   Allergic/Immunologic: Negative for environmental allergies.   Neurological:  Negative for dizziness, seizures, syncope, weakness, light-headedness, numbness and headaches.   Hematological:  Negative for adenopathy.   Psychiatric/Behavioral:  Negative for confusion. The patient is nervous/anxious.    All other systems reviewed and are negative.      Objective   BP 90/60   Pulse 68   Ht 5' 2\" (1.575 m)   Wt 54.9 kg (121 lb)   SpO2 99%   BMI 22.13 kg/m²      Physical Exam  Vitals and nursing note reviewed.   Constitutional:       General: She is not in acute distress.     Appearance: Normal appearance. She is well-developed. She is not ill-appearing.   HENT:      Head: Normocephalic.   Eyes:      Conjunctiva/sclera: Conjunctivae normal.   Cardiovascular:      Rate and Rhythm: Normal rate and regular rhythm.      Pulses: Normal pulses.           Carotid pulses are 2+ on the right side and 2+ on the left side.       Radial pulses are 2+ on the right side and 2+ on the left side.        Posterior tibial pulses are 2+ on the right side and 2+ on the left side.      Heart sounds: " Normal heart sounds. No murmur heard.  Pulmonary:      Effort: Pulmonary effort is normal. No respiratory distress.      Breath sounds: Normal breath sounds. No decreased breath sounds, wheezing, rhonchi or rales.   Abdominal:      General: Abdomen is flat. Bowel sounds are normal. There is no distension.      Palpations: Abdomen is soft.      Tenderness: There is no abdominal tenderness. There is no guarding.   Musculoskeletal:         General: No swelling. Normal range of motion.        Hands:       Cervical back: Normal range of motion and neck supple.      Right lower leg: No edema.      Left lower leg: No edema.   Skin:     General: Skin is warm and dry.      Capillary Refill: Capillary refill takes less than 2 seconds.   Neurological:      General: No focal deficit present.      Mental Status: She is alert and oriented to person, place, and time.   Psychiatric:         Mood and Affect: Mood normal.         Behavior: Behavior normal.         Thought Content: Thought content normal.         Judgment: Judgment normal.

## 2024-12-26 DIAGNOSIS — F51.04 PSYCHOPHYSIOLOGICAL INSOMNIA: ICD-10-CM

## 2024-12-26 RX ORDER — ZOLPIDEM TARTRATE 12.5 MG/1
12.5 TABLET, FILM COATED, EXTENDED RELEASE ORAL
Qty: 30 TABLET | Refills: 0 | Status: SHIPPED | OUTPATIENT
Start: 2024-12-26

## 2025-01-02 ENCOUNTER — TELEPHONE (OUTPATIENT)
Age: 25
End: 2025-01-02

## 2025-01-08 ENCOUNTER — OFFICE VISIT (OUTPATIENT)
Dept: FAMILY MEDICINE CLINIC | Facility: CLINIC | Age: 25
End: 2025-01-08
Payer: COMMERCIAL

## 2025-01-08 VITALS
BODY MASS INDEX: 22.26 KG/M2 | DIASTOLIC BLOOD PRESSURE: 60 MMHG | SYSTOLIC BLOOD PRESSURE: 90 MMHG | HEIGHT: 62 IN | OXYGEN SATURATION: 96 % | WEIGHT: 121 LBS | HEART RATE: 99 BPM

## 2025-01-08 DIAGNOSIS — F41.9 ANXIETY: ICD-10-CM

## 2025-01-08 DIAGNOSIS — M79.89 SWELLING OF RIGHT MIDDLE FINGER: Primary | ICD-10-CM

## 2025-01-08 PROCEDURE — 99214 OFFICE O/P EST MOD 30 MIN: CPT | Performed by: NURSE PRACTITIONER

## 2025-01-08 NOTE — ASSESSMENT & PLAN NOTE
Musculoskeletal ultrasound was ordered to be completed to assess for ganglion cyst or other masses in the affected area which could be causing the swelling.  Referral was also placed to orthopedics for further management.  Orders:  •  Ambulatory Referral to Orthopedic Surgery; Future  •  US MSK limited; Future

## 2025-01-08 NOTE — PROGRESS NOTES
Name: Maci Padilla      : 2000      MRN: [id number]  Encounter Provider: TONI Cantu  Encounter Date: 2025   Encounter department: [facility] PRIMARY CARE  :  Assessment & Plan  Swelling of right middle finger  Musculoskeletal ultrasound was ordered to be completed to assess for ganglion cyst or other masses in the affected area which could be causing the swelling.  Referral was also placed to orthopedics for further management.  Orders:  •  Ambulatory Referral to Orthopedic Surgery; Future  •  US MSK limited; Future    Anxiety  Patient symptoms have improved since beginning BuSpar.  She will be maintained on the current dosage of the medication.             Depression Screening and Follow-up Plan: Patient was screened for depression during today's encounter. They screened negative with a PHQ-2 score of 0.      History of Present Illness     Right middle finger swelling: At patient's last office visit on 2025 she was reporting pain, swelling, and redness of the right middle finger.  The patient was diagnosed with cellulitis of the finger at that time and she was prescribed a 7-day course of Bactrim for treatment.  She reports that since finishing the antibiotic she has noted less pain and redness in the finger but she continues to note swelling along the palmar aspect of the finger.  She denies any difficulty or pain when moving the finger at this point.    Anxiety: At patient's last office visit she was started on BuSpar 5 mg twice daily for treatment of uncontrolled anxiety.  She does report that her anxiety has been more manageable since beginning the medication.      Review of Systems   Constitutional:  Negative for chills and fever.   HENT:  Negative for ear pain and sore throat.    Eyes:  Negative for pain and visual disturbance.   Respiratory:  Negative for cough, chest tightness, shortness of breath and wheezing.    Cardiovascular:  Negative for chest pain,  "palpitations and leg swelling.   Gastrointestinal:  Negative for abdominal pain, constipation, diarrhea, nausea and vomiting.   Endocrine: Negative for cold intolerance and heat intolerance.   Genitourinary:  Negative for decreased urine volume, dysuria and hematuria.   Musculoskeletal:  Positive for joint swelling (right middle finger). Negative for arthralgias, back pain and myalgias.   Skin:  Negative for color change and rash.   Allergic/Immunologic: Negative for environmental allergies.   Neurological:  Negative for dizziness, seizures, syncope, weakness, light-headedness, numbness and headaches.   Hematological:  Negative for adenopathy.   Psychiatric/Behavioral:  Negative for confusion. The patient is nervous/anxious (improved).    All other systems reviewed and are negative.      Objective   BP 90/60 (BP Location: Right arm, Patient Position: Sitting, Cuff Size: Standard)   Pulse 99   Ht 5' 2\" (1.575 m)   Wt 54.9 kg (121 lb)   SpO2 96%   BMI 22.13 kg/m²      Physical Exam  Vitals and nursing note reviewed.   Constitutional:       General: She is not in acute distress.     Appearance: Normal appearance. She is well-developed. She is not ill-appearing.   HENT:      Head: Normocephalic.   Eyes:      Conjunctiva/sclera: Conjunctivae normal.   Cardiovascular:      Rate and Rhythm: Normal rate and regular rhythm.      Pulses: Normal pulses.           Carotid pulses are 2+ on the right side and 2+ on the left side.       Radial pulses are 2+ on the right side and 2+ on the left side.        Posterior tibial pulses are 2+ on the right side and 2+ on the left side.      Heart sounds: Normal heart sounds. No murmur heard.  Pulmonary:      Effort: Pulmonary effort is normal. No respiratory distress.      Breath sounds: Normal breath sounds. No decreased breath sounds, wheezing, rhonchi or rales.   Abdominal:      General: Abdomen is flat. Bowel sounds are normal. There is no distension.      Palpations: Abdomen is " soft.      Tenderness: There is no abdominal tenderness. There is no guarding.   Musculoskeletal:         General: No swelling. Normal range of motion.      Right hand: Swelling present. No deformity, tenderness or bony tenderness. Normal range of motion. Normal strength. Normal sensation.      Cervical back: Normal range of motion and neck supple.      Right lower leg: No edema.      Left lower leg: No edema.      Comments: A small amount of swelling was noted around the palmar aspect of the PIP joint of the right middle finger.  No redness, warmth to touch, or pain with palpation of the area was noted.   Skin:     General: Skin is warm and dry.      Capillary Refill: Capillary refill takes less than 2 seconds.   Neurological:      General: No focal deficit present.      Mental Status: She is alert and oriented to person, place, and time.   Psychiatric:         Mood and Affect: Mood normal.         Behavior: Behavior normal.         Thought Content: Thought content normal.         Judgment: Judgment normal.

## 2025-01-08 NOTE — ASSESSMENT & PLAN NOTE
Patient symptoms have improved since beginning BuSpar.  She will be maintained on the current dosage of the medication.

## 2025-01-09 DIAGNOSIS — F41.9 ANXIETY: ICD-10-CM

## 2025-01-10 RX ORDER — BUSPIRONE HYDROCHLORIDE 5 MG/1
5 TABLET ORAL 2 TIMES DAILY
Qty: 180 TABLET | Refills: 1 | Status: SHIPPED | OUTPATIENT
Start: 2025-01-10 | End: 2025-01-17

## 2025-01-17 ENCOUNTER — TELEMEDICINE (OUTPATIENT)
Dept: FAMILY MEDICINE CLINIC | Facility: CLINIC | Age: 25
End: 2025-01-17
Payer: COMMERCIAL

## 2025-01-17 VITALS — WEIGHT: 120 LBS | BODY MASS INDEX: 22.08 KG/M2 | HEIGHT: 62 IN

## 2025-01-17 DIAGNOSIS — F41.9 ANXIETY: Primary | ICD-10-CM

## 2025-01-17 DIAGNOSIS — F51.04 PSYCHOPHYSIOLOGICAL INSOMNIA: ICD-10-CM

## 2025-01-17 PROCEDURE — 99214 OFFICE O/P EST MOD 30 MIN: CPT | Performed by: NURSE PRACTITIONER

## 2025-01-17 RX ORDER — BUSPIRONE HYDROCHLORIDE 7.5 MG/1
7.5 TABLET ORAL 2 TIMES DAILY
Qty: 60 TABLET | Refills: 2 | Status: SHIPPED | OUTPATIENT
Start: 2025-01-17

## 2025-01-17 NOTE — ASSESSMENT & PLAN NOTE
Patient's BuSpar dosage was increased to 7.5 mg twice daily.  I will reassess patient's symptoms at her 1 month follow-up.  Orders:  •  busPIRone (BUSPAR) 7.5 mg tablet; Take 1 tablet (7.5 mg total) by mouth 2 (two) times a day

## 2025-01-17 NOTE — PROGRESS NOTES
Virtual Regular Visit  Name: Maci Padilla      : 2000      MRN: [id number]  Encounter Provider: TONI Cantu  Encounter Date: 2025   Encounter department: [facility] PRIMARY CARE      Verification of patient location:  Patient is located at Home in the following state in which I hold an active license [place] :  Assessment & Plan  Anxiety  Patient's BuSpar dosage was increased to 7.5 mg twice daily.  I will reassess patient's symptoms at her 1 month follow-up.  Orders:  •  busPIRone (BUSPAR) 7.5 mg tablet; Take 1 tablet (7.5 mg total) by mouth 2 (two) times a day    Psychophysiological insomnia  Well-controlled on current regimen.           Encounter provider TONI Cantu    The patient was identified by name and date of birth. Maci Padilla was informed that this is a telemedicine visit and that the visit is being conducted through the Epic Embedded platform. She agrees to proceed..  My office door was closed. No one else was in the room.  She acknowledged consent and understanding of privacy and security of the video platform. The patient has agreed to participate and understands they can discontinue the visit at any time.    Patient is aware this is a billable service.     History of Present Illness     Anxiety: Patient reports that since beginning BuSpar her anxiety has improved however, she returned to college recently and due to this added stress she feels that her anxiety is not completely controlled at this point.  She is interested in increasing her dosage of BuSpar.    Insomnia: Well-controlled with as needed use of Ambien.      Review of Systems   Constitutional:  Negative for appetite change, chills and fever.   HENT:  Negative for ear pain and sore throat.    Eyes:  Negative for pain and visual disturbance.   Respiratory:  Negative for cough, chest tightness and shortness of breath.    Cardiovascular:  Negative for chest pain, palpitations and leg swelling.  "  Gastrointestinal:  Negative for abdominal pain, constipation, diarrhea, nausea and vomiting.   Endocrine: Negative for cold intolerance and heat intolerance.   Genitourinary:  Negative for decreased urine volume, difficulty urinating, dysuria and hematuria.   Musculoskeletal:  Negative for arthralgias, back pain and myalgias.   Skin:  Negative for color change, rash and wound.   Allergic/Immunologic: Negative for environmental allergies.   Neurological:  Negative for dizziness, seizures, syncope, light-headedness and headaches.   Hematological:  Negative for adenopathy.   Psychiatric/Behavioral:  Negative for confusion, dysphoric mood, hallucinations, self-injury, sleep disturbance and suicidal ideas. The patient is nervous/anxious. The patient is not hyperactive.    All other systems reviewed and are negative.      Objective   Ht 5' 2\" (1.575 m)   Wt 54.4 kg (120 lb)   LMP 12/24/2024   BMI 21.95 kg/m²     Physical Exam  Vitals reviewed: limited due to video visit.   Constitutional:       General: She is not in acute distress.     Appearance: Normal appearance. She is not ill-appearing.   Neurological:      Mental Status: She is alert.         Visit Time  Total Visit Duration: 15 minutes   "

## 2025-02-09 DIAGNOSIS — F51.04 PSYCHOPHYSIOLOGICAL INSOMNIA: ICD-10-CM

## 2025-02-10 RX ORDER — ZOLPIDEM TARTRATE 12.5 MG/1
12.5 TABLET, FILM COATED, EXTENDED RELEASE ORAL
Qty: 30 TABLET | Refills: 0 | Status: SHIPPED | OUTPATIENT
Start: 2025-02-10

## 2025-02-11 DIAGNOSIS — F41.9 ANXIETY: ICD-10-CM

## 2025-02-11 RX ORDER — BUSPIRONE HYDROCHLORIDE 7.5 MG/1
7.5 TABLET ORAL 2 TIMES DAILY
Qty: 180 TABLET | Refills: 1 | Status: SHIPPED | OUTPATIENT
Start: 2025-02-11

## 2025-03-31 DIAGNOSIS — F51.04 PSYCHOPHYSIOLOGICAL INSOMNIA: ICD-10-CM

## 2025-04-01 RX ORDER — ZOLPIDEM TARTRATE 12.5 MG/1
12.5 TABLET, FILM COATED, EXTENDED RELEASE ORAL
Qty: 30 TABLET | Refills: 0 | Status: SHIPPED | OUTPATIENT
Start: 2025-04-01

## 2025-05-14 DIAGNOSIS — F51.04 PSYCHOPHYSIOLOGICAL INSOMNIA: ICD-10-CM

## 2025-05-15 RX ORDER — ZOLPIDEM TARTRATE 12.5 MG/1
12.5 TABLET, FILM COATED, EXTENDED RELEASE ORAL
Qty: 30 TABLET | Refills: 0 | Status: SHIPPED | OUTPATIENT
Start: 2025-05-15

## 2025-07-09 DIAGNOSIS — F51.04 PSYCHOPHYSIOLOGICAL INSOMNIA: ICD-10-CM

## 2025-07-09 NOTE — TELEPHONE ENCOUNTER
Medication Refill Request       Medication: zolpidem (AMBIEN CR) 12.5 MG CR tablet     Dose/Frequency:        Take 1 tablet (12.5 mg total) by mouth daily at bedtime as needed for sleep           Quantity: 30 tablet (30 day supply)     Pharmacy: [facility]  Address: [address] [place] [id number]  Phone: [phone]    Office:   [x] PCP/Provider -   [] Specialty/Provider -     Does the patient have enough for 3 days?   [] Yes- Send to POD (normal priority)   [x] No - Send as HP to POD

## 2025-07-10 RX ORDER — ZOLPIDEM TARTRATE 12.5 MG/1
12.5 TABLET, FILM COATED, EXTENDED RELEASE ORAL
Qty: 30 TABLET | Refills: 0 | Status: SHIPPED | OUTPATIENT
Start: 2025-07-10

## 2025-07-17 ENCOUNTER — TELEPHONE (OUTPATIENT)
Age: 25
End: 2025-07-17

## 2025-07-17 NOTE — TELEPHONE ENCOUNTER
"Behavioral Health Outpatient Intake Questions    Referred By   : PCP    Please advise interviewee that they need to answer all questions truthfully to allow for best care, and any misrepresentations of information may affect their ability to be seen at this clinic   => Was this discussed? Yes     If Minor Child (under age 18)    Who is/are the legal guardian(s) of the child?     Is there a custody agreement? No     If \"YES\"- Custody orders must be obtained prior to scheduling the first appointment  In addition, Consent to Treatment must be signed by all legal guardians prior to scheduling the first appointment    If \"NO\"- Consent to Treatment must be signed by all legal guardians prior to scheduling the first appointment    Behavioral Health Outpatient Intake History -     Presenting Problem (in patient's own words):     Anxiety.      Are there any communication barriers for this patient?     No                                               If yes, please describe barriers: NONE   If there is a unique situation, please refer to Liam Ball/Manjula Kong for final determination.    Are you taking any psychiatric medications? Yes     If \"YES\" -What are they Buspar     If \"YES\" -Who prescribes? PCP    Has the Patient previously received outpatient Talk Therapy or Medication Management from [facility]  No        If \"YES\"- When, Where and with Whom?         If \"NO\" -Has Patient received these services elsewhere?       If \"YES\" -When, Where, and with Whom?    Has the Patient abused alcohol or other substances in the last 6 months ? No  No concerns of substance abuse are reported.     If \"YES\" -What substance, How much, How often?     If illegal substance: Refer to [facility] (for [facility]) or [facility].   If Alcohol in excess of 10 drinks per week:  Refer to [facility] (for [facility]) or [facility]    Legal History-     Is this treatment court ordered? No   If \"yes \"send to :  Talk Therapy : Send to Liam Ball " "for final determination   Med Management: Send to Dr. Bull for final determination     Has the Patient been convicted of a felony?  No   If \"Yes\" send to -When, What?  Talk Therapy: Send to Liam Ball for final determination   Med Management: Send to Dr. Bull for final determination     ACCEPTED as a patient Yes  If \"Yes\" Appointment Date: 9/11/2025    Referred Elsewhere? No  If “Yes” - (Where? Ex: [facility], [facility], [facility], [facility], etc.)       Name of Insurance Co:[employer].   Insurance ID#[id number]  Insurance Phone #  If ins is primary or secondary?Primary   If patient is a minor, parents information such as Name, D.O.B of guarantor.  "

## 2025-07-24 ENCOUNTER — TELEPHONE (OUTPATIENT)
Dept: PSYCHIATRY | Facility: CLINIC | Age: 25
End: 2025-07-24

## 2025-07-24 NOTE — TELEPHONE ENCOUNTER
One week follow up call for New Patient appointment with Ethan Daly MD on 09/11/2025 was made on 07/24/2025. Writer informed patient of New Patient paperwork needing to be completed 5 days prior to the appointment. Writer confirmed paperwork has been sent via [employer].    Appointment was made on: 07/17/2025

## 2025-08-07 ENCOUNTER — OFFICE VISIT (OUTPATIENT)
Dept: FAMILY MEDICINE CLINIC | Facility: CLINIC | Age: 25
End: 2025-08-07
Payer: COMMERCIAL

## 2025-08-07 VITALS
HEIGHT: 62 IN | SYSTOLIC BLOOD PRESSURE: 112 MMHG | BODY MASS INDEX: 23.32 KG/M2 | OXYGEN SATURATION: 99 % | WEIGHT: 126.7 LBS | HEART RATE: 65 BPM | DIASTOLIC BLOOD PRESSURE: 60 MMHG

## 2025-08-07 DIAGNOSIS — Z00.00 ANNUAL PHYSICAL EXAM: Primary | ICD-10-CM

## 2025-08-07 DIAGNOSIS — Z01.84 IMMUNITY STATUS TESTING: ICD-10-CM

## 2025-08-07 DIAGNOSIS — F41.9 ANXIETY: ICD-10-CM

## 2025-08-07 DIAGNOSIS — F51.04 PSYCHOPHYSIOLOGICAL INSOMNIA: ICD-10-CM

## 2025-08-07 PROCEDURE — 99214 OFFICE O/P EST MOD 30 MIN: CPT | Performed by: NURSE PRACTITIONER

## 2025-08-07 PROCEDURE — 99395 PREV VISIT EST AGE 18-39: CPT | Performed by: NURSE PRACTITIONER
